# Patient Record
Sex: FEMALE | Race: BLACK OR AFRICAN AMERICAN | Employment: FULL TIME | URBAN - METROPOLITAN AREA
[De-identification: names, ages, dates, MRNs, and addresses within clinical notes are randomized per-mention and may not be internally consistent; named-entity substitution may affect disease eponyms.]

---

## 2017-02-27 ENCOUNTER — TELEPHONE (OUTPATIENT)
Dept: FAMILY MEDICINE CLINIC | Age: 27
End: 2017-02-27

## 2017-02-28 ENCOUNTER — OFFICE VISIT (OUTPATIENT)
Dept: FAMILY MEDICINE CLINIC | Age: 27
End: 2017-02-28

## 2017-02-28 VITALS
WEIGHT: 155.4 LBS | DIASTOLIC BLOOD PRESSURE: 86 MMHG | BODY MASS INDEX: 28.6 KG/M2 | TEMPERATURE: 96.4 F | OXYGEN SATURATION: 99 % | RESPIRATION RATE: 16 BRPM | HEIGHT: 62 IN | SYSTOLIC BLOOD PRESSURE: 118 MMHG | HEART RATE: 95 BPM

## 2017-02-28 DIAGNOSIS — K21.9 GASTROESOPHAGEAL REFLUX DISEASE, ESOPHAGITIS PRESENCE NOT SPECIFIED: ICD-10-CM

## 2017-02-28 DIAGNOSIS — R07.1 CHEST PAIN ON RESPIRATION: Primary | ICD-10-CM

## 2017-02-28 PROBLEM — A60.00 HERPES SIMPLEX INFECTION OF GENITOURINARY SYSTEM: Status: ACTIVE | Noted: 2017-02-28

## 2017-02-28 RX ORDER — PANTOPRAZOLE SODIUM 20 MG/1
20 TABLET, DELAYED RELEASE ORAL DAILY
Qty: 30 TAB | Refills: 1 | Status: SHIPPED | OUTPATIENT
Start: 2017-02-28 | End: 2017-05-04 | Stop reason: ALTCHOICE

## 2017-02-28 RX ORDER — VALACYCLOVIR HYDROCHLORIDE 1 G/1
1000 TABLET, FILM COATED ORAL
Refills: 5 | COMMUNITY
Start: 2017-02-23

## 2017-02-28 RX ORDER — FLUCONAZOLE 150 MG/1
TABLET ORAL
Refills: 0 | COMMUNITY
Start: 2017-02-23 | End: 2017-02-28 | Stop reason: ALTCHOICE

## 2017-02-28 RX ORDER — NAPROXEN 500 MG/1
500 TABLET ORAL
Qty: 60 TAB | Refills: 1 | Status: SHIPPED | OUTPATIENT
Start: 2017-02-28 | End: 2017-12-08 | Stop reason: ALTCHOICE

## 2017-02-28 NOTE — PROGRESS NOTES
HISTORY OF PRESENT ILLNESS  Gio Gibson is a 28 y.o. female. HPI    Pt of Dr. Camacho Sullivan, here for an \"acute visit. \"  Starting about 2 months ago, she started having some pain in her chest when she take a deep breath. Chest pain goes away as soon as she stops taking a deep breath. Pain is described as \"tight\". Pain extends up to her throat as well. Pain is more \"uncomfortable\" than painful. Pain is not present when she is swallowing. Has not tried anything for the pain. Denies any trauma or excessive/new exercise routine prior to the onset of symptoms. Denies associated symptoms of shortness of breath, palpitations, dizziness, numbness/tingling, peripheral edema. Has also been experiencing heartburn for the past month. Had heartburn twice when drinking alcohol, not with spicy foods or any other triggers. Took Tums, and symptoms resolved after that awhile. It does not occur every time she drinks, but has occurred twice. Drinks 2 - 3 shots most weekends. Went to [facility] on December 2016, had a CXR, which was normal. Patient doesn't think they did an EKG. Does not have any paperwork from that visit. No known history of early or sudden cardiac death in her family. No history of asthma or any other lung disease. Non-tobacco user; smokes marijuana 2 - 3 times per month. Past Medical History:   Diagnosis Date    Headache      History reviewed. No pertinent surgical history.   Family History   Problem Relation Age of Onset    No Known Problems Mother     No Known Problems Father      Social History     Social History    Marital status: SINGLE     Spouse name: N/A    Number of children: N/A    Years of education: N/A     Social History Main Topics    Smoking status: Never Smoker    Smokeless tobacco: Never Used    Alcohol use 1.2 oz/week     0 Standard drinks or equivalent, 2 Shots of liquor per week      Comment: social    Drug use: Yes     Special: Marijuana    Sexual activity: Yes     Partners: Male     Birth control/ protection: None      Comment: 3 male sexual partners in last 12 months     Other Topics Concern    None     Social History Narrative     Patient Active Problem List   Diagnosis Code    Environmental allergies Z91.09    Herpes simplex infection of genitourinary system A60.00     Outpatient Encounter Prescriptions as of 2/28/2017   Medication Sig Dispense Refill    valACYclovir (VALTREX) 1 gram tablet TAKE 1 TABLET BY MOUTH FOR 7 DAYS WITH OUTBREAKS  5    ASPIRIN/ACETAMINOPHEN/CAFFEINE (EXCEDRIN EXTRA STRENGTH PO) Take 2 Tabs by mouth as needed.  [DISCONTINUED] fluconazole (DIFLUCAN) 150 mg tablet TAKE 1 TABLET BY MOUTH ONCE  0    fluticasone (FLONASE) 50 mcg/actuation nasal spray 2 sprays by Both Nostrils route daily as needed for Rhinitis. 1 Bottle 6    promethazine-dextromethorphan (PROMETHAZINE-DM) 6.25-15 mg/5 mL syrup Take 5 mL by mouth every six (6) hours as needed for Cough. 180 mL 1    naproxen sodium (ALEVE) 220 mg tablet Take 2 Tabs by mouth two (2) times daily as needed. No facility-administered encounter medications on file as of 2/28/2017. Visit Vitals    /86 (BP 1 Location: Left arm, BP Patient Position: Sitting)    Pulse 95    Temp 96.4 °F (35.8 °C) (Oral)    Resp 16    Ht 5' 2\" (1.575 m)    Wt 155 lb 6.4 oz (70.5 kg)    LMP 02/23/2017    SpO2 99%    BMI 28.42 kg/m2         Review of Systems   Constitutional: Negative for chills, fever and malaise/fatigue. HENT: Negative for congestion and sore throat. Respiratory: Negative for cough, sputum production, shortness of breath and wheezing. Cardiovascular: Positive for chest pain. Negative for palpitations, orthopnea, leg swelling and PND. Gastrointestinal: Positive for heartburn. Negative for abdominal pain, blood in stool, constipation, diarrhea, nausea and vomiting. Musculoskeletal: Negative for myalgias.    Neurological: Negative for dizziness, tingling, tremors, sensory change, speech change, focal weakness, seizures and headaches. Physical Exam   Constitutional: She is oriented to person, place, and time. She appears well-developed and well-nourished. No distress. HENT:   Head: Normocephalic and atraumatic. Right Ear: External ear normal.   Left Ear: External ear normal.   Neck: Normal range of motion. Neck supple. No thyromegaly present. Cardiovascular: Normal rate, regular rhythm, normal heart sounds and intact distal pulses. Pulmonary/Chest: Effort normal and breath sounds normal. No respiratory distress. She has no wheezes. She has no rales. She exhibits tenderness. Neurological: She is alert and oriented to person, place, and time. Skin: Skin is warm and dry. She is not diaphoretic. Psychiatric: She has a normal mood and affect. Her behavior is normal. Judgment normal.   Nursing note and vitals reviewed. ASSESSMENT and PLAN    ICD-10-CM ICD-9-CM    1. Chest pain on respiration R07.1 786.52 AMB POC EKG ROUTINE W/ 12 LEADS, INTER & REP      D DIMER      SED RATE (ESR)      C REACTIVE PROTEIN, QT      CBC WITH AUTOMATED DIFF      METABOLIC PANEL, COMPREHENSIVE   2. Gastroesophageal reflux disease, esophagitis presence not specified K21.9 530.81      1. Chest pain on inspiration  Normal physical exam and EKG today (reviewed with Dr. Derrick Fowler). As pain/discomfort has persisted x 2 months, questionable etiology though seems pleuritic in nature. Will draw basic labs today and start protonix once daily and naproxen twice daily for 1 week, then PRN. Follow up with PCP in 1 month. Follow-up Disposition:  Return in about 1 month (around 3/28/2017) for med f/u.

## 2017-02-28 NOTE — MR AVS SNAPSHOT
Visit Information Date & Time Provider Department Dept. Phone Encounter #  
 2/28/2017  8:10 AM Larry Woodson NP [facility] [phone] [id number] Follow-up Instructions Return in about 1 month (around 3/28/2017) for med f/u. Upcoming Health Maintenance Date Due DTaP/Tdap/Td series (2 - Td) 6/12/2017 PAP AKA CERVICAL CYTOLOGY 8/12/2018 Allergies as of 2/28/2017  Review Complete On: 2/28/2017 By: Larry Woodson NP No Known Allergies Current Immunizations  Reviewed on 5/5/2014 Name Date Human Papillomavirus 12/19/2007, 8/3/2007, 6/1/2007 TB Skin Test (PPD) 5/27/2015 Tdap 6/12/2007 Not reviewed this visit You Were Diagnosed With   
  
 Codes Comments Chest pain on respiration    -  Primary ICD-10-CM: R07.1 ICD-9-CM: 786.52 Gastroesophageal reflux disease, esophagitis presence not specified     ICD-10-CM: K21.9 ICD-9-CM: 530.81 Vitals BP  
  
  
  
  
  
 118/86 (BP 1 Location: Left arm, BP Patient Position: Sitting) BMI and BSA Data Body Mass Index Body Surface Area  
 28.42 kg/m 2 1.76 m 2 Preferred Pharmacy Pharmacy Name Phone [facility]/PHARMACY #[id number]- [place] [phone] [place] [phone] Your Updated Medication List  
  
   
This list is accurate as of: 2/28/17  9:03 AM.  Always use your most recent med list.  
  
  
  
  
 EXCEDRIN EXTRA STRENGTH PO Take 2 Tabs by mouth as needed. naproxen 500 mg tablet Commonly known as:  NAPROSYN Take 1 Tab by mouth two (2) times daily as needed. pantoprazole 20 mg tablet Commonly known as:  PROTONIX Take 1 Tab by mouth daily. valACYclovir 1 gram tablet Commonly known as:  VALTREX  
TAKE 1 TABLET BY MOUTH FOR 7 DAYS WITH OUTBREAKS Prescriptions Sent to Pharmacy Refills  
 pantoprazole (PROTONIX) 20 mg tablet 1 Sig: Take 1 Tab by mouth daily. Class: Normal  
 Pharmacy: [address] [place] [address] Ph #: [phone] Route: Oral  
 naproxen (NAPROSYN) 500 mg tablet 1 Sig: Take 1 Tab by mouth two (2) times daily as needed. Class: Normal  
 Pharmacy: [address] [place] [address] [address] Ph #: [phone] Route: Oral  
  
We Performed the Following AMB POC EKG ROUTINE W/ 12 LEADS, INTER & REP [49402 CPT(R)] C REACTIVE PROTEIN, QT [60390 CPT(R)] CBC WITH AUTOMATED DIFF [26080 CPT(R)] D DIMER [id number] CPT(R)] METABOLIC PANEL, COMPREHENSIVE [20842 CPT(R)] SED RATE (ESR) [id number] CPT(R)] Follow-up Instructions Return in about 1 month (around 3/28/2017) for med f/u. Patient Instructions Gastroesophageal Reflux Disease (GERD): Care Instructions Your Care Instructions Gastroesophageal reflux disease (GERD) is the backward flow of stomach acid into the esophagus. The esophagus is the tube that leads from your throat to your stomach. A one-way valve prevents the stomach acid from moving up into this tube. When you have GERD, this valve does not close tightly enough. If you have mild GERD symptoms including heartburn, you may be able to control the problem with antacids or over-the-counter medicine. Changing your diet, losing weight, and making other lifestyle changes can also help reduce symptoms. Follow-up care is a key part of your treatment and safety. Be sure to make and go to all appointments, and call your doctor if you are having problems. Its also a good idea to know your test results and keep a list of the medicines you take. How can you care for yourself at home? · Take your medicines exactly as prescribed. Call your doctor if you think you are having a problem with your medicine. · Your doctor may recommend over-the-counter medicine.  For mild or occasional indigestion, antacids, such as Tums, Gaviscon, Mylanta, or Maalox, may help. Your doctor also may recommend over-the-counter acid reducers, such as Pepcid AC, Tagamet HB, Zantac 75, or Prilosec. Read and follow all instructions on the label. If you use these medicines often, talk with your doctor. · Change your eating habits. ¨ Its best to eat several small meals instead of two or three large meals. ¨ After you eat, wait 2 to 3 hours before you lie down. ¨ Chocolate, mint, and alcohol can make GERD worse. ¨ Spicy foods, foods that have a lot of acid (like tomatoes and oranges), and coffee can make GERD symptoms worse in some people. If your symptoms are worse after you eat a certain food, you may want to stop eating that food to see if your symptoms get better. · Do not smoke or chew tobacco. Smoking can make GERD worse. If you need help quitting, talk to your doctor about stop-smoking programs and medicines. These can increase your chances of quitting for good. · If you have GERD symptoms at night, raise the head of your bed 6 to 8 inches by putting the frame on blocks or placing a foam wedge under the head of your mattress. (Adding extra pillows does not work.) · Do not wear tight clothing around your middle. · Lose weight if you need to. Losing just 5 to 10 pounds can help. When should you call for help? Call your doctor now or seek immediate medical care if: 
· You have new or different belly pain. · Your stools are black and tarlike or have streaks of blood. Watch closely for changes in your health, and be sure to contact your doctor if: 
· Your symptoms have not improved after 2 days. · Food seems to catch in your throat or chest. 
Where can you learn more? Go to http://miranda-dang.info/. Enter Z075 in the search box to learn more about \"Gastroesophageal Reflux Disease (GERD): Care Instructions. \" Current as of: August 9, 2016 Content Version: 11.1 © 2983-8433 Healthwise, Incorporated. Care instructions adapted under license by [employer] (which disclaims liability or warranty for this information). If you have questions about a medical condition or this instruction, always ask your healthcare professional. [address] any warranty or liability for your use of this information. Introducing [facility] & HEALTH SERVICES! Sarai Poole introduces ChronoWake patient portal. Now you can access parts of your medical record, email your doctor's office, and request medication refills online. 1. In your internet browser, go to [email or web address] 2. Click on the First Time User? Click Here link in the Sign In box. You will see the New Member Sign Up page. 3. Enter your ChronoWake Access Code exactly as it appears below. You will not need to use this code after youve completed the sign-up process. If you do not sign up before the expiration date, you must request a new code. · ChronoWake Access Code: [email or web address] Expires: 5/29/2017  9:03 AM 
 
4. Enter the last four digits of your Social Security Number (xxxx) and Date of Birth (mm/dd/yyyy) as indicated and click Submit. You will be taken to the next sign-up page. 5. Create a ChronoWake ID. This will be your ChronoWake login ID and cannot be changed, so think of one that is secure and easy to remember. 6. Create a ChronoWake password. You can change your password at any time. 7. Enter your Password Reset Question and Answer. This can be used at a later time if you forget your password. 8. Enter your e-mail address. You will receive e-mail notification when new information is available in [address] 9. Click Sign Up. You can now view and download portions of your medical record. 10. Click the Download Summary menu link to download a portable copy of your medical information.  
 
If you have questions, please visit the Frequently Asked Questions section of the [facility]. Remember, [employer] is NOT to be used for urgent needs. For medical emergencies, dial 911. Now available from your iPhone and Android! Please provide this summary of care documentation to your next provider. Your primary care clinician is listed as Sandhya Ni. If you have any questions after today's visit, please call [phone].

## 2017-02-28 NOTE — PATIENT INSTRUCTIONS

## 2017-02-28 NOTE — PROGRESS NOTES
Chief Complaint   Patient presents with    Chest Pain     x 2 months     Patient states chest pain is upper chest area and notices it especially with swallowing.

## 2017-03-01 LAB
ALBUMIN SERPL-MCNC: 3.9 G/DL (ref 3.5–5.5)
ALBUMIN/GLOB SERPL: 1.3 {RATIO} (ref 1.1–2.5)
ALP SERPL-CCNC: 93 IU/L (ref 39–117)
ALT SERPL-CCNC: 10 IU/L (ref 0–32)
AST SERPL-CCNC: 12 IU/L (ref 0–40)
BASOPHILS # BLD AUTO: 0 X10E3/UL (ref 0–0.2)
BASOPHILS NFR BLD AUTO: 0 %
BILIRUB SERPL-MCNC: 0.3 MG/DL (ref 0–1.2)
BUN SERPL-MCNC: 8 MG/DL (ref 6–20)
BUN/CREAT SERPL: 12 (ref 8–20)
CALCIUM SERPL-MCNC: 8.8 MG/DL (ref 8.7–10.2)
CHLORIDE SERPL-SCNC: 100 MMOL/L (ref 96–106)
CO2 SERPL-SCNC: 24 MMOL/L (ref 18–29)
CREAT SERPL-MCNC: 0.66 MG/DL (ref 0.57–1)
CRP SERPL-MCNC: 23.8 MG/L (ref 0–4.9)
D DIMER PPP FEU-MCNC: 0.32 MG/L FEU (ref 0–0.49)
EOSINOPHIL # BLD AUTO: 0.4 X10E3/UL (ref 0–0.4)
EOSINOPHIL NFR BLD AUTO: 4 %
ERYTHROCYTE [DISTWIDTH] IN BLOOD BY AUTOMATED COUNT: 15.5 % (ref 12.3–15.4)
ERYTHROCYTE [SEDIMENTATION RATE] IN BLOOD BY WESTERGREN METHOD: 8 MM/HR (ref 0–32)
GLOBULIN SER CALC-MCNC: 3.1 G/DL (ref 1.5–4.5)
GLUCOSE SERPL-MCNC: 75 MG/DL (ref 65–99)
HCT VFR BLD AUTO: 32.4 % (ref 34–46.6)
HGB BLD-MCNC: 10.9 G/DL (ref 11.1–15.9)
IMM GRANULOCYTES # BLD: 0 X10E3/UL (ref 0–0.1)
IMM GRANULOCYTES NFR BLD: 0 %
LYMPHOCYTES # BLD AUTO: 1.9 X10E3/UL (ref 0.7–3.1)
LYMPHOCYTES NFR BLD AUTO: 18 %
MCH RBC QN AUTO: 21.9 PG (ref 26.6–33)
MCHC RBC AUTO-ENTMCNC: 33.6 G/DL (ref 31.5–35.7)
MCV RBC AUTO: 65 FL (ref 79–97)
MONOCYTES # BLD AUTO: 0.6 X10E3/UL (ref 0.1–0.9)
MONOCYTES NFR BLD AUTO: 5 %
NEUTROPHILS # BLD AUTO: 7.9 X10E3/UL (ref 1.4–7)
NEUTROPHILS NFR BLD AUTO: 73 %
PLATELET # BLD AUTO: 390 X10E3/UL (ref 150–379)
POTASSIUM SERPL-SCNC: 4.2 MMOL/L (ref 3.5–5.2)
PROT SERPL-MCNC: 7 G/DL (ref 6–8.5)
RBC # BLD AUTO: 4.98 X10E6/UL (ref 3.77–5.28)
SODIUM SERPL-SCNC: 141 MMOL/L (ref 134–144)
WBC # BLD AUTO: 10.8 X10E3/UL (ref 3.4–10.8)

## 2017-03-02 ENCOUNTER — TELEPHONE (OUTPATIENT)
Dept: FAMILY MEDICINE CLINIC | Age: 27
End: 2017-03-02

## 2017-04-11 ENCOUNTER — OFFICE VISIT (OUTPATIENT)
Dept: FAMILY MEDICINE CLINIC | Age: 27
End: 2017-04-11

## 2017-04-11 VITALS
BODY MASS INDEX: 28.46 KG/M2 | HEIGHT: 62 IN | TEMPERATURE: 97.4 F | DIASTOLIC BLOOD PRESSURE: 63 MMHG | OXYGEN SATURATION: 99 % | SYSTOLIC BLOOD PRESSURE: 121 MMHG | WEIGHT: 154.65 LBS | RESPIRATION RATE: 16 BRPM | HEART RATE: 92 BPM

## 2017-04-11 DIAGNOSIS — L08.9 SKIN INFECTION: ICD-10-CM

## 2017-04-11 DIAGNOSIS — H92.02 EAR PAIN, REFERRED, LEFT: Primary | ICD-10-CM

## 2017-04-11 RX ORDER — CLINDAMYCIN HYDROCHLORIDE 300 MG/1
300 CAPSULE ORAL EVERY 6 HOURS
Qty: 28 CAP | Refills: 0 | Status: SHIPPED | OUTPATIENT
Start: 2017-04-11 | End: 2017-04-18

## 2017-04-11 NOTE — MR AVS SNAPSHOT
Visit Information Date & Time Provider Department Dept. Phone Encounter #  
 4/11/2017  7:30 AM Israel Shields NP [facility] [phone] [id number] Follow-up Instructions Return if symptoms worsen or fail to improve. Upcoming Health Maintenance Date Due DTaP/Tdap/Td series (2 - Td) 6/12/2017 PAP AKA CERVICAL CYTOLOGY 8/12/2018 Allergies as of 4/11/2017  Review Complete On: 4/11/2017 By: Israel Shields NP No Known Allergies Current Immunizations  Reviewed on 5/5/2014 Name Date Human Papillomavirus 12/19/2007, 8/3/2007, 6/1/2007 TB Skin Test (PPD) 5/27/2015 Tdap 6/12/2007 Not reviewed this visit You Were Diagnosed With   
  
 Codes Comments Ear pain, referred, left    -  Primary ICD-10-CM: H92.02 
ICD-9-CM: 388.72 Skin infection     ICD-10-CM: L08.9 ICD-9-CM: 860. 9 Vitals BP Pulse Temp Resp Height(growth percentile) Weight(growth percentile) 121/63 (BP 1 Location: Left arm, BP Patient Position: Sitting) 92 97.4 °F (36.3 °C) (Oral) 16 5' 2\" (1.575 m) 154 lb 10.4 oz (70.1 kg) LMP SpO2 BMI OB Status Smoking Status 03/21/2017 (Approximate) 99% 28.29 kg/m2 Having regular periods Never Smoker BMI and BSA Data Body Mass Index Body Surface Area  
 28.29 kg/m 2 1.75 m 2 Preferred Pharmacy Pharmacy Name Phone [facility]/PHARMACY #[id number]- [place] [phone] [phone] Your Updated Medication List  
  
   
This list is accurate as of: 4/11/17  7:47 AM.  Always use your most recent med list.  
  
  
  
  
 clindamycin 300 mg capsule Commonly known as:  CLEOCIN Take 1 Cap by mouth every six (6) hours for 7 days. EXCEDRIN EXTRA STRENGTH PO Take 2 Tabs by mouth as needed. naproxen 500 mg tablet Commonly known as:  NAPROSYN Take 1 Tab by mouth two (2) times daily as needed. pantoprazole 20 mg tablet Commonly known as:  PROTONIX Take 1 Tab by mouth daily. valACYclovir 1 gram tablet Commonly known as:  VALTREX  
TAKE 1 TABLET BY MOUTH FOR 7 DAYS WITH OUTBREAKS Prescriptions Sent to Pharmacy Refills  
 clindamycin (CLEOCIN) 300 mg capsule 0 Sig: Take 1 Cap by mouth every six (6) hours for 7 days. Class: Normal  
 Pharmacy: [place], [address]  #: [phone] Route: Oral  
  
Follow-up Instructions Return if symptoms worsen or fail to improve. Patient Instructions Earache: Care Instructions Your Care Instructions Even though infection is a common cause of ear pain, not all ear pain means an infection. If you have ear pain and don't have an infection, it could be because of a jaw problem, such as temporomandibular joint (TMJ) pain. Or it could be because of a neck problem. When ear discomfort or pain is mild or comes and goes without other symptoms, home treatment may be all you need. Follow-up care is a key part of your treatment and safety. Be sure to make and go to all appointments, and call your doctor if you are having problems. It's also a good idea to know your test results and keep a list of the medicines you take. How can you care for yourself at home? · Apply heat on the ear to ease pain. To apply heat, put a warm water bottle, a heating pad set on low, or a warm cloth on your ear. Do not go to sleep with a heating pad on your skin. · Take an over-the-counter pain medicine, such as acetaminophen (Tylenol), ibuprofen (Advil, Motrin), or naproxen (Aleve). Be safe with medicines. Read and follow all instructions on the label. · Do not take two or more pain medicines at the same time unless the doctor told you to. Many pain medicines have acetaminophen, which is Tylenol. Too much acetaminophen (Tylenol) can be harmful. · Never insert anything, such as a cotton swab or a kenyon pin, into the ear. When should you call for help? Call your doctor now or seek immediate medical care if: 
· You have a fever. · You feel a lump in your neck or jaw. · The area around the ear starts to swell. · There is pus or blood draining from the ear. · There is new or different drainage from the ear. Watch closely for changes in your health, and be sure to contact your doctor if: 
· Your pain gets worse. · You do not get better as expected. Where can you learn more? Go to http://miranda-dang.info/. Enter J914 in the search box to learn more about \"Earache: Care Instructions. \" Current as of: July 29, 2016 Content Version: 11.2 © 2063-7347 Learn It Live. Care instructions adapted under license by [employer] (which disclaims liability or warranty for this information). If you have questions about a medical condition or this instruction, always ask your healthcare professional. [place] [address] any warranty or liability for your use of this information. Introducing [facility] & HEALTH SERVICES! [employer] introduces Neteven patient portal. Now you can access parts of your medical record, email your doctor's office, and request medication refills online. 1. In your internet browser, go to [email or web address] 2. Click on the First Time User? Click Here link in the Sign In box. You will see the New Member Sign Up page. 3. Enter your Neteven Access Code exactly as it appears below. You will not need to use this code after youve completed the sign-up process. If you do not sign up before the expiration date, you must request a new code. · Neteven Access Code: [email or web address] Expires: 5/29/2017 10:03 AM 
 
4. Enter the last four digits of your Social Security Number (xxxx) and Date of Birth (mm/dd/yyyy) as indicated and click Submit.  You will be taken to the next sign-up page. 5. Create a Swiftpage ID. This will be your Swiftpage login ID and cannot be changed, so think of one that is secure and easy to remember. 6. Create a Swiftpage password. You can change your password at any time. 7. Enter your Password Reset Question and Answer. This can be used at a later time if you forget your password. 8. Enter your e-mail address. You will receive e-mail notification when new information is available in [address] 9. Click Sign Up. You can now view and download portions of your medical record. 10. Click the Download Summary menu link to download a portable copy of your medical information. If you have questions, please visit the Frequently Asked Questions section of the Swiftpage website. Remember, Swiftpage is NOT to be used for urgent needs. For medical emergencies, dial 911. Now available from your iPhone and Android! Please provide this summary of care documentation to your next provider. Your primary care clinician is listed as Buddy Kaur. If you have any questions after today's visit, please call [phone].

## 2017-04-11 NOTE — PROGRESS NOTES
HISTORY OF PRESENT ILLNESS  Ely Lopez is a 28 y.o. female. HPI  Pt of Dr. Alf Segal, here for an acute visit. Has been having some pain on the left side of her face, just in front of her left ear. Pain started about 2 weeks ago, and felt a lump 2 days ago. No pain inside the ear. Pain is present all the time, but worse when she touches it. Pain is described as \"sore\" and \"uncomfortable. \" Pain radiates from temple to jaw. No fevers or chills. Denies exudate. Has never had anything like this before. Of note, went to the dentist about 2 weeks ago and had a filling done on her back lower molar on the left side. Additionally, chest pain has resolved since her visit last month. Takes protonix whenever she remembers, or when she has symptoms, and symptoms resolve right away. Past Medical History:   Diagnosis Date    Headache      History reviewed. No pertinent surgical history. Family History   Problem Relation Age of Onset    No Known Problems Mother     No Known Problems Father      Social History     Social History    Marital status: SINGLE     Spouse name: N/A    Number of children: N/A    Years of education: N/A     Social History Main Topics    Smoking status: Never Smoker    Smokeless tobacco: Never Used    Alcohol use 1.2 oz/week     0 Standard drinks or equivalent, 2 Shots of liquor per week      Comment: social    Drug use: Yes     Special: Marijuana    Sexual activity: Yes     Partners: Male     Birth control/ protection: None      Comment: 3 male sexual partners in last 12 months     Other Topics Concern    None     Social History Narrative     Patient Active Problem List   Diagnosis Code    Environmental allergies Z91.09    Herpes simplex infection of genitourinary system A60.00     Outpatient Encounter Prescriptions as of 4/11/2017   Medication Sig Dispense Refill    clindamycin (CLEOCIN) 300 mg capsule Take 1 Cap by mouth every six (6) hours for 7 days.  28 Cap 0    valACYclovir (VALTREX) 1 gram tablet TAKE 1 TABLET BY MOUTH FOR 7 DAYS WITH OUTBREAKS  5    pantoprazole (PROTONIX) 20 mg tablet Take 1 Tab by mouth daily. 30 Tab 1    naproxen (NAPROSYN) 500 mg tablet Take 1 Tab by mouth two (2) times daily as needed. 60 Tab 1    ASPIRIN/ACETAMINOPHEN/CAFFEINE (EXCEDRIN EXTRA STRENGTH PO) Take 2 Tabs by mouth as needed. No facility-administered encounter medications on file as of 4/11/2017. Visit Vitals    /63 (BP 1 Location: Left arm, BP Patient Position: Sitting)    Pulse 92    Temp 97.4 °F (36.3 °C) (Oral)    Resp 16    Ht 5' 2\" (1.575 m)    Wt 154 lb 10.4 oz (70.1 kg)    LMP 03/21/2017 (Approximate)    SpO2 99%    BMI 28.29 kg/m2         Review of Systems   Constitutional: Negative for chills and fever. HENT: Positive for ear pain. Negative for congestion, ear discharge and sore throat. Cardiovascular: Negative for chest pain and palpitations. Gastrointestinal: Negative for heartburn. Neurological: Negative for headaches. Physical Exam   Constitutional: She is oriented to person, place, and time. She appears well-developed and well-nourished. No distress. HENT:   Head: Normocephalic and atraumatic. Right Ear: Hearing, tympanic membrane, external ear and ear canal normal.   Left Ear: Tympanic membrane, external ear and ear canal normal.   Ears:    Mouth/Throat: Mucous membranes are normal. Normal dentition. No dental abscesses or dental caries. No posterior oropharyngeal edema, posterior oropharyngeal erythema or tonsillar abscesses. Cardiovascular: Normal rate, regular rhythm and normal heart sounds. Pulmonary/Chest: Effort normal and breath sounds normal. No respiratory distress. She has no wheezes. Lymphadenopathy:        Head (right side): No submental, no submandibular, no tonsillar, no preauricular, no posterior auricular and no occipital adenopathy present.         Head (left side): No submental, no submandibular, no tonsillar, no preauricular, no posterior auricular and no occipital adenopathy present. Neurological: She is alert and oriented to person, place, and time. Skin: Skin is warm and dry. She is not diaphoretic. Psychiatric: She has a normal mood and affect. Her behavior is normal. Judgment normal.   Nursing note and vitals reviewed. ASSESSMENT and PLAN    ICD-10-CM ICD-9-CM    1. Ear pain, referred, left H92.02 388.72 clindamycin (CLEOCIN) 300 mg capsule   2. Skin infection L08.9 686.9 clindamycin (CLEOCIN) 300 mg capsule     1. Left ear pain/skin infection  Timing with recent dental visit suspicious for dental source of pain. Will treat with clindamycin x 7 days, along with naproxen as needed for pain (Rx previously provided to patient). Patient to return if symptoms worsen or fail to improve. Follow-up Disposition:  Return if symptoms worsen or fail to improve.    Shelia Riddle NP

## 2017-04-11 NOTE — PROGRESS NOTES
Chief Complaint   Patient presents with    Ear Pain     left ear     Patient states pain in left ear started two weeks ago and progressively more uncomfortable. Patient notes a small knot in front of ear lobe .

## 2017-04-11 NOTE — PATIENT INSTRUCTIONS
Earache: Care Instructions  Your Care Instructions  Even though infection is a common cause of ear pain, not all ear pain means an infection. If you have ear pain and don't have an infection, it could be because of a jaw problem, such as temporomandibular joint (TMJ) pain. Or it could be because of a neck problem. When ear discomfort or pain is mild or comes and goes without other symptoms, home treatment may be all you need. Follow-up care is a key part of your treatment and safety. Be sure to make and go to all appointments, and call your doctor if you are having problems. It's also a good idea to know your test results and keep a list of the medicines you take. How can you care for yourself at home? · Apply heat on the ear to ease pain. To apply heat, put a warm water bottle, a heating pad set on low, or a warm cloth on your ear. Do not go to sleep with a heating pad on your skin. · Take an over-the-counter pain medicine, such as acetaminophen (Tylenol), ibuprofen (Advil, Motrin), or naproxen (Aleve). Be safe with medicines. Read and follow all instructions on the label. · Do not take two or more pain medicines at the same time unless the doctor told you to. Many pain medicines have acetaminophen, which is Tylenol. Too much acetaminophen (Tylenol) can be harmful. · Never insert anything, such as a cotton swab or a kenyon pin, into the ear. When should you call for help? Call your doctor now or seek immediate medical care if:  · You have a fever. · You feel a lump in your neck or jaw. · The area around the ear starts to swell. · There is pus or blood draining from the ear. · There is new or different drainage from the ear. Watch closely for changes in your health, and be sure to contact your doctor if:  · Your pain gets worse. · You do not get better as expected. Where can you learn more? Go to http://miranda-dang.info/.   Enter Y809 in the search box to learn more about \"Earache: Care Instructions. \"  Current as of: July 29, 2016  Content Version: 11.2  © 7392-6826 [employer], [employer]. Care instructions adapted under license by [employer] (which disclaims liability or warranty for this information). If you have questions about a medical condition or this instruction, always ask your healthcare professional. [place] [address] any warranty or liability for your use of this information.

## 2017-05-04 ENCOUNTER — OFFICE VISIT (OUTPATIENT)
Dept: INTERNAL MEDICINE CLINIC | Age: 27
End: 2017-05-04

## 2017-05-04 VITALS
DIASTOLIC BLOOD PRESSURE: 76 MMHG | OXYGEN SATURATION: 99 % | SYSTOLIC BLOOD PRESSURE: 120 MMHG | BODY MASS INDEX: 28.08 KG/M2 | HEART RATE: 95 BPM | TEMPERATURE: 98.6 F | HEIGHT: 62 IN | RESPIRATION RATE: 16 BRPM | WEIGHT: 152.6 LBS

## 2017-05-04 DIAGNOSIS — R59.0 LAD (LYMPHADENOPATHY), SUBMANDIBULAR: ICD-10-CM

## 2017-05-04 DIAGNOSIS — R59.0 LAD (LYMPHADENOPATHY), PREAURICULAR: Primary | ICD-10-CM

## 2017-05-04 NOTE — MR AVS SNAPSHOT
Visit Information Date & Time Provider Department Dept. Phone Encounter #  
 5/4/2017 10:40 AM Shlomo Ozuna MD [facility] [phone] [id number] Your Appointments 5/24/2017  3:00 PM  
ROUTINE CARE with Sapphire Olivera MD  
[facility] [address]) Appt Note: Pt lump in neck and in front of ear  
 [address] [place] [address] [id number] [phone] [address] [place] [address] [address] Upcoming Health Maintenance Date Due DTaP/Tdap/Td series (2 - Td) 6/12/2017 INFLUENZA AGE 9 TO ADULT 8/1/2017 PAP AKA CERVICAL CYTOLOGY 8/12/2018 Allergies as of 5/4/2017  Review Complete On: 5/4/2017 By: Shlomo Ozuna MD  
 No Known Allergies Current Immunizations  Reviewed on 5/5/2014 Name Date Human Papillomavirus 12/19/2007, 8/3/2007, 6/1/2007 TB Skin Test (PPD) 5/27/2015 Tdap 6/12/2007 Not reviewed this visit You Were Diagnosed With   
  
 Codes Comments LAD (lymphadenopathy), preauricular    -  Primary ICD-10-CM: R59.0 ICD-9-CM: 785.6 LAD (lymphadenopathy), submandibular     ICD-10-CM: R59.0 ICD-9-CM: 979. 6 Vitals BP Pulse Temp Resp Height(growth percentile) Weight(growth percentile) 120/76 (BP 1 Location: Left arm, BP Patient Position: Sitting) 95 98.6 °F (37 °C) (Oral) 16 5' 2.25\" (1.581 m) 152 lb 9.6 oz (69.2 kg) LMP SpO2 BMI OB Status Smoking Status 04/21/2017 99% 27.69 kg/m2 Having regular periods Never Smoker Vitals History BMI and BSA Data Body Mass Index Body Surface Area  
 27.69 kg/m 2 1.74 m 2 Preferred Pharmacy Pharmacy Name Phone [id number] [place], [place] - [address] [place] [address] [phone] Your Updated Medication List  
  
   
This list is accurate as of: 5/4/17 11:26 AM.  Always use your most recent med list.  
  
  
  
  
 EXCEDRIN EXTRA STRENGTH PO Take 2 Tabs by mouth as needed. naproxen 500 mg tablet Commonly known as:  NAPROSYN Take 1 Tab by mouth two (2) times daily as needed. valACYclovir 1 gram tablet Commonly known as:  VALTREX  
TAKE 1 TABLET BY MOUTH FOR 7 DAYS WITH OUTBREAKS We Performed the Following CBC WITH AUTOMATED DIFF [62887 CPT(R)] To-Do List   
 2017 Imaging:  CT NECK SOFT TISSUE W CONT Patient Instructions MyChart Activation Thank you for requesting access to PeptiVir. Please follow the instructions below to securely access and download your online medical record. PeptiVir allows you to send messages to your doctor, view your test results, renew your prescriptions, schedule appointments, and more. How Do I Sign Up? 1. In your internet browser, go to [email or web address] 
2. Click on the First Time User? Click Here link in the Sign In box. You will be redirect to the New Member Sign Up page. 3. Enter your PeptiVir Access Code exactly as it appears below. You will not need to use this code after youve completed the sign-up process. If you do not sign up before the expiration date, you must request a new code. PeptiVir Access Code: [email or web address] Expires: 2017 10:03 AM (This is the date your PeptiVir access code will ) 4. Enter the last four digits of your Social Security Number (xxxx) and Date of Birth (mm/dd/yyyy) as indicated and click Submit. You will be taken to the next sign-up page. 5. Create a PeptiVir ID. This will be your PeptiVir login ID and cannot be changed, so think of one that is secure and easy to remember. 6. Create a PeptiVir password. You can change your password at any time. 7. Enter your Password Reset Question and Answer. This can be used at a later time if you forget your password. 8. Enter your e-mail address. You will receive e-mail notification when new information is available in [address] 9. Click Sign Up. You can now view and download portions of your medical record. 10. Click the Download Summary menu link to download a portable copy of your medical information. Additional Information If you have questions, please visit the Frequently Asked Questions section of the Freeze Tag website at [email or web address]/[employer] Veter[employer] [email or web address]/. Remember, ISIGN Mediat is NOT to be used for urgent needs. For medical emergencies, dial 911. Treat your allergies:  
Zyrtec (cetirizine) 10mg at night Flonase nasal spray 2 squirts each nostril daily. Introducing [facility]! [facility] introduces Freeze Tag patient portal. Now you can access parts of your medical record, email your doctor's office, and request medication refills online. 1. In your internet browser, go to [email or web address]/National Veterinary Associatest 2. Click on the First Time User? Click Here link in the Sign In box. You will see the New Member Sign Up page. 3. Enter your Freeze Tag Access Code exactly as it appears below. You will not need to use this code after youve completed the sign-up process. If you do not sign up before the expiration date, you must request a new code. · Freeze Tag Access Code: [email or web address] Expires: 5/29/2017 10:03 AM 
 
4. Enter the last four digits of your Social Security Number (xxxx) and Date of Birth (mm/dd/yyyy) as indicated and click Submit. You will be taken to the next sign-up page. 5. Create a Freeze Tag ID. This will be your Freeze Tag login ID and cannot be changed, so think of one that is secure and easy to remember. 6. Create a Freeze Tag password. You can change your password at any time. 7. Enter your Password Reset Question and Answer. This can be used at a later time if you forget your password. 8. Enter your e-mail address. You will receive e-mail notification when new information is available in [address] 9. Click Sign Up. You can now view and download portions of your medical record. 10. Click the Download Summary menu link to download a portable copy of your medical information. If you have questions, please visit the Frequently Asked Questions section of the [employer] website. Remember, iTraff Technology is NOT to be used for urgent needs. For medical emergencies, dial 911. Now available from your iPhone and Android! Please provide this summary of care documentation to your next provider. Your primary care clinician is listed as ITA GLASS. If you have any questions after today's visit, please call [phone].

## 2017-05-04 NOTE — PATIENT INSTRUCTIONS
Yieldr Activation    Thank you for requesting access to Yieldr. Please follow the instructions below to securely access and download your online medical record. Yieldr allows you to send messages to your doctor, view your test results, renew your prescriptions, schedule appointments, and more. How Do I Sign Up? 1. In your internet browser, go to [email or web address]  2. Click on the First Time User? Click Here link in the Sign In box. You will be redirect to the New Member Sign Up page. 3. Enter your Yieldr Access Code exactly as it appears below. You will not need to use this code after youve completed the sign-up process. If you do not sign up before the expiration date, you must request a new code. Yieldr Access Code: [email or web address]  Expires: 2017 10:03 AM (This is the date your Yieldr access code will )    4. Enter the last four digits of your Social Security Number (xxxx) and Date of Birth (mm/dd/yyyy) as indicated and click Submit. You will be taken to the next sign-up page. 5. Create a Yieldr ID. This will be your Yieldr login ID and cannot be changed, so think of one that is secure and easy to remember. 6. Create a Yieldr password. You can change your password at any time. 7. Enter your Password Reset Question and Answer. This can be used at a later time if you forget your password. 8. Enter your e-mail address. You will receive e-mail notification when new information is available in [address] 9. Click Sign Up. You can now view and download portions of your medical record. 10. Click the Download Summary menu link to download a portable copy of your medical information. Additional Information    If you have questions, please visit the Frequently Asked Questions section of the Yieldr website at [email or web address]. [employer]. [employer]/SpePharmhart/. Remember, Yieldr is NOT to be used for urgent needs. For medical emergencies, dial 911.       Treat your allergies:   Zyrtec (cetirizine) 10mg at night  Flonase nasal spray 2 squirts each nostril daily.

## 2017-05-04 NOTE — PROGRESS NOTES
HISTORY OF PRESENT ILLNESS  Keri Matthew is a 28 y.o. female. HPI  Lump in front of left ear and then over left jaw. Seen previously by NP at PCP, last 4/11, placed on clindamycin helped with ear pain but lump has not improved. Lump first appeared approx 2 months ago, still slightly tender but has not increased in size. Mass over left jaw just began this weekend, mobile. No other masses on neck or jaw. Sinuses, teeth and hearing is normal.   Returned to dentist as prior to symtpoms appearing had cavity filled on left lower teeth - repeated xray and nml. No fevers or chills. Feels a little tight on left while eating/chewing. Past Medical History:   Diagnosis Date    Headache      History reviewed. No pertinent surgical history. No Known Allergies    Current Outpatient Prescriptions:     valACYclovir (VALTREX) 1 gram tablet, TAKE 1 TABLET BY MOUTH FOR 7 DAYS WITH OUTBREAKS, Disp: , Rfl: 5    naproxen (NAPROSYN) 500 mg tablet, Take 1 Tab by mouth two (2) times daily as needed. , Disp: 60 Tab, Rfl: 1    ASPIRIN/ACETAMINOPHEN/CAFFEINE (EXCEDRIN EXTRA STRENGTH PO), Take 2 Tabs by mouth as needed. , Disp: , Rfl:   Social History     Social History    Marital status: SINGLE     Spouse name: N/A    Number of children: N/A    Years of education: N/A     Occupational History    Not on file.      Social History Main Topics    Smoking status: Never Smoker    Smokeless tobacco: Never Used    Alcohol use 1.2 oz/week     2 Shots of liquor, 0 Standard drinks or equivalent per week      Comment: social    Drug use: Yes     Special: Marijuana    Sexual activity: Yes     Partners: Male     Birth control/ protection: None      Comment: 3 male sexual partners in last 12 months     Other Topics Concern    Not on file     Social History Narrative     Family History   Problem Relation Age of Onset    No Known Problems Mother     No Known Problems Father      Visit Vitals    /76 (BP 1 Location: Left arm, BP Patient Position: Sitting)    Pulse 95    Temp 98.6 °F (37 °C) (Oral)    Resp 16    Ht 5' 2.25\" (1.581 m)    Wt 152 lb 9.6 oz (69.2 kg)    SpO2 99%    BMI 27.69 kg/m2       ROS  See above  Physical Exam   Constitutional: She appears well-developed and well-nourished. HENT:   Head: Normocephalic and atraumatic. Right Ear: External ear normal.   Left Ear: External ear normal.   Nares - mild edema of turbinates with clear discharge  OP - post nasal drainage  Tender enlarged mobile soft left preauricular ln   Neck: No thyromegaly present. Cardiovascular: Normal rate, regular rhythm and normal heart sounds. Exam reveals no gallop and no friction rub. No murmur heard. Pulmonary/Chest: Effort normal and breath sounds normal.   Musculoskeletal: She exhibits no edema. Lymphadenopathy:     She has cervical adenopathy (enlarged tender mobile left submandibular LN near angle of jaw). Vitals reviewed. ASSESSMENT and PLAN  LAD with 2 enlarged LN, 1 left preauricular and 2nd submandibular. Did not improve with abx. Some allergy symptoms but no symptoms in infection.   Check CT of neck and cbc to further evaluate  Orders Placed This Encounter    CT NECK SOFT TISSUE W CONT    CBC WITH AUTOMATED DIFF     Follow-up Disposition: Not on File

## 2017-05-05 LAB
BASOPHILS # BLD AUTO: 0 X10E3/UL (ref 0–0.2)
BASOPHILS NFR BLD AUTO: 0 %
EOSINOPHIL # BLD AUTO: 0.1 X10E3/UL (ref 0–0.4)
EOSINOPHIL NFR BLD AUTO: 1 %
ERYTHROCYTE [DISTWIDTH] IN BLOOD BY AUTOMATED COUNT: 18.4 % (ref 12.3–15.4)
HCT VFR BLD AUTO: 35.2 % (ref 34–46.6)
HGB BLD-MCNC: 11.8 G/DL (ref 11.1–15.9)
IMM GRANULOCYTES # BLD: 0 X10E3/UL (ref 0–0.1)
IMM GRANULOCYTES NFR BLD: 0 %
LYMPHOCYTES # BLD AUTO: 1.7 X10E3/UL (ref 0.7–3.1)
LYMPHOCYTES NFR BLD AUTO: 17 %
MCH RBC QN AUTO: 21.1 PG (ref 26.6–33)
MCHC RBC AUTO-ENTMCNC: 33.5 G/DL (ref 31.5–35.7)
MCV RBC AUTO: 63 FL (ref 79–97)
MONOCYTES # BLD AUTO: 0.6 X10E3/UL (ref 0.1–0.9)
MONOCYTES NFR BLD AUTO: 6 %
NEUTROPHILS # BLD AUTO: 7.6 X10E3/UL (ref 1.4–7)
NEUTROPHILS NFR BLD AUTO: 76 %
PLATELET # BLD AUTO: 422 X10E3/UL (ref 150–379)
RBC # BLD AUTO: 5.6 X10E6/UL (ref 3.77–5.28)
WBC # BLD AUTO: 10.1 X10E3/UL (ref 3.4–10.8)

## 2017-05-12 ENCOUNTER — TELEPHONE (OUTPATIENT)
Dept: ONCOLOGY | Age: 27
End: 2017-05-12

## 2017-05-12 ENCOUNTER — HOSPITAL ENCOUNTER (OUTPATIENT)
Dept: CT IMAGING | Age: 27
Discharge: HOME | End: 2017-05-12
Attending: INTERNAL MEDICINE
Payer: COMMERCIAL

## 2017-05-12 DIAGNOSIS — R59.0 LAD (LYMPHADENOPATHY), PREAURICULAR: ICD-10-CM

## 2017-05-12 DIAGNOSIS — R59.0 LAD (LYMPHADENOPATHY), SUBMANDIBULAR: ICD-10-CM

## 2017-05-12 PROCEDURE — 70491 CT SOFT TISSUE NECK W/DYE: CPT

## 2017-05-12 PROCEDURE — 74011636320 HC RX REV CODE- 636/320: Performed by: RADIOLOGY

## 2017-05-12 RX ADMIN — IOPAMIDOL 100 ML: 612 INJECTION, SOLUTION INTRAVENOUS at 09:08

## 2017-05-12 NOTE — TELEPHONE ENCOUNTER
Dr. Sprague Come called stating she would like to speak with Dr. Leonardo Aparicio in regards to patient. She has not been seen at this office before but patient had a ct of her neck done. Dr. Corina Sargent would like to discuss the CT.  She can be reached on her cell phone at [phone]

## 2017-05-15 ENCOUNTER — TELEPHONE (OUTPATIENT)
Dept: INTERNAL MEDICINE CLINIC | Age: 27
End: 2017-05-15

## 2017-05-15 NOTE — TELEPHONE ENCOUNTER
Discussed with pt. Discussed possibility for carcinoma of thymus. Referred to [facility] at [facility].

## 2017-05-18 ENCOUNTER — PATIENT MESSAGE (OUTPATIENT)
Dept: INTERNAL MEDICINE CLINIC | Age: 27
End: 2017-05-18

## 2017-05-18 NOTE — TELEPHONE ENCOUNTER
----- Message from Tatyana Grimes MD sent at 5/18/2017  2:38 PM EDT -----  Regarding: FW: Test Results Question  Can you call Jessica Shelley office at [facility]. CHeck on appointment. 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 We sent records Tuesday. She has not heard back from them. I want her in ASAP. thanks  ----- Message -----     From: Cas Aidanfidel Blanco     Sent: 5/18/2017   8:44 AM       To: Tatyana Grimes MD  Subject: FW: Test Results Question                            ----- Message -----     From: Denisa Giang     Sent: 5/18/2017   8:35 AM       To: Evelyn Caba Pool  Subject: RE: Test Results Question                        How long should I wait for them to call me back?! I'm becoming very anxious about this. I know you said if they can't see me by this week or next that you would call.  ----- Message -----  From: Tatyana Grimes MD  Sent: 5/16/2017  4:03 PM EDT  To: Denisa Giang  Subject: RE: Test Results Question    We will fax over our information to his office. Dr. Lauro Castro    ----- Message -----     From: Denisa Giang     Sent: 5/16/2017  3:12 PM EDT       To: Tatyana Grimes MD  Subject: Test Results Question    Hi Dr. Lauro Castro. .. I have called the office of Dr. Vijay Millan as you referred me to do so. I completed a New Patient intake and waiting for the intake  to call me back and make an appointment.  Just wanted to let you know what's going on

## 2017-12-01 ENCOUNTER — TELEPHONE (OUTPATIENT)
Dept: INTERNAL MEDICINE CLINIC | Age: 27
End: 2017-12-01

## 2017-12-01 NOTE — TELEPHONE ENCOUNTER
Pt is requesting to come in on either the 6th, 7th, or 8th of next week. The pt called to make and appt because she had broken out in a rash all over her body and the earliest she could get in to see Dr. Ermelinda Ortega is 12-12-17, the pt would like a call because she would like to come in the week of the 4th.  The pt best contact number is [phone].              From answering service

## 2017-12-04 NOTE — TELEPHONE ENCOUNTER
Left message asking for a return call to the office. Moved the patient's appt up to 12/08/17 at 12 pm.  Need to make sure that it is fine with the patient.

## 2017-12-08 ENCOUNTER — OFFICE VISIT (OUTPATIENT)
Dept: INTERNAL MEDICINE CLINIC | Age: 27
End: 2017-12-08

## 2017-12-08 VITALS
BODY MASS INDEX: 28.34 KG/M2 | SYSTOLIC BLOOD PRESSURE: 106 MMHG | HEIGHT: 62 IN | HEART RATE: 82 BPM | OXYGEN SATURATION: 99 % | WEIGHT: 154 LBS | RESPIRATION RATE: 17 BRPM | DIASTOLIC BLOOD PRESSURE: 67 MMHG | TEMPERATURE: 98.3 F

## 2017-12-08 DIAGNOSIS — L20.82 FLEXURAL ECZEMA: Primary | ICD-10-CM

## 2017-12-08 RX ORDER — MOMETASONE FUROATE 1 MG/G
CREAM TOPICAL DAILY
Qty: 45 G | Refills: 0 | Status: SHIPPED | OUTPATIENT
Start: 2017-12-08 | End: 2019-02-11 | Stop reason: ALTCHOICE

## 2017-12-08 NOTE — MR AVS SNAPSHOT
Visit Information Date & Time Provider Department Dept. Phone Encounter #  
 12/8/2017 12:00 PM Lucie Valera MD [facility] [phone] [id number] Upcoming Health Maintenance Date Due DTaP/Tdap/Td series (2 - Td) 6/12/2017 Influenza Age 5 to Adult 8/1/2017 PAP AKA CERVICAL CYTOLOGY 8/12/2018 Allergies as of 12/8/2017  Review Complete On: 12/8/2017 By: Lucie Valera MD  
 No Known Allergies Current Immunizations  Reviewed on 5/5/2014 Name Date Human Papillomavirus 12/19/2007, 8/3/2007, 6/1/2007 TB Skin Test (PPD) 5/27/2015 Tdap 6/12/2007 Not reviewed this visit You Were Diagnosed With   
  
 Codes Comments Flexural eczema    -  Primary ICD-10-CM: V50.13 ICD-9-CM: 691.8 Vitals BP Pulse Temp Resp Height(growth percentile) Weight(growth percentile) 106/67 (BP 1 Location: Left arm, BP Patient Position: Sitting) 82 98.3 °F (36.8 °C) (Oral) 17 5' 2.25\" (1.581 m) 154 lb (69.9 kg) LMP SpO2 BMI OB Status Smoking Status 11/26/2017 99% 27.94 kg/m2 Having regular periods Never Smoker Vitals History BMI and BSA Data Body Mass Index Body Surface Area  
 27.94 kg/m 2 1.75 m 2 Preferred Pharmacy Pharmacy Name Phone [id number] Yates, [address] [place] [phone] Your Updated Medication List  
  
   
This list is accurate as of: 12/8/17 12:22 PM.  Always use your most recent med list.  
  
  
  
  
 EXCEDRIN EXTRA STRENGTH PO Take 2 Tabs by mouth as needed. mometasone 0.1 % topical cream  
Commonly known as:  James Hof Apply  to affected area daily. valACYclovir 1 gram tablet Commonly known as:  VALTREX  
TAKE 1 TABLET BY MOUTH FOR 7 DAYS WITH OUTBREAKS Prescriptions Sent to Pharmacy Refills  
 mometasone (ELOCON) 0.1 % topical cream 0 Sig: Apply  to affected area daily.   
 Class: Normal  
 Pharmacy: [id number] #: [phone] Route: Topical  
  
Introducing [facility]! Dear Mimi Chavez: Thank you for requesting a [employer] account. Our records indicate that you already have an active [employer] account. You can access your account anytime at [email or web address] Did you know that you can access your hospital and ER discharge instructions at any time in [employer]? You can also review all of your test results from your hospital stay or ER visit. Additional Information If you have questions, please visit the Frequently Asked Questions section of the [employer] website at [email or web address] Remember, [employer] is NOT to be used for urgent needs. For medical emergencies, dial 911. Now available from your iPhone and Android! Please provide this summary of care documentation to your next provider. Your primary care clinician is listed as ITA GLASS. If you have any questions after today's visit, please call [phone].

## 2017-12-08 NOTE — PROGRESS NOTES
HISTORY OF PRESENT ILLNESS  Tiffani German is a 28 y.o. female. HPI  Pruritic rash developed just prior to Thanksgiving. Scattered areas on body, bilat antecubital spaces, palmar wrist, groin, abdomen and behind her knees. Red raised bumps. Worse at night. Saw her nurse at oncology, felt was dermatitis and recommended seeing PCP. Using benedryl itch cream which helps temporarily. Hot shower can help sooth area as well. No new beauty products or household items. Hx of eczema and similar in appearance. Had radiology f/u - had PET scan Tuesday - no evidence of tumors. Stopped chemo in end of September and stopped radiation end of October for Hodgkin's Lymphoma. Doing great! Current Outpatient Prescriptions:     valACYclovir (VALTREX) 1 gram tablet, TAKE 1 TABLET BY MOUTH FOR 7 DAYS WITH OUTBREAKS, Disp: , Rfl: 5    ASPIRIN/ACETAMINOPHEN/CAFFEINE (EXCEDRIN EXTRA STRENGTH PO), Take 2 Tabs by mouth as needed. , Disp: , Rfl:     Visit Vitals    /67 (BP 1 Location: Left arm, BP Patient Position: Sitting)    Pulse 82    Temp 98.3 °F (36.8 °C) (Oral)    Resp 17    Ht 5' 2.25\" (1.581 m)    Wt 154 lb (69.9 kg)    SpO2 99%    BMI 27.94 kg/m2     ROS  See above  Physical Exam   Constitutional: She appears well-developed and well-nourished. HENT:   Head: Normocephalic and atraumatic. Skin:   Erythematous papular rash with evidence of excoriations right antecubital space, left flexor wrist and bilat groin. Vitals reviewed. ASSESSMENT and PLAN  Dermatitis/eczema - mometasone cream to affected areas daily. Zyrtec 10mg at night. If no improvement, will refer to derm.     Orders Placed This Encounter    mometasone (ELOCON) 0.1 % topical cream     Follow-up Disposition: Not on File

## 2019-01-22 ENCOUNTER — OFFICE VISIT (OUTPATIENT)
Dept: INTERNAL MEDICINE CLINIC | Age: 29
End: 2019-01-22

## 2019-01-22 VITALS
RESPIRATION RATE: 20 BRPM | OXYGEN SATURATION: 97 % | HEART RATE: 87 BPM | DIASTOLIC BLOOD PRESSURE: 76 MMHG | TEMPERATURE: 98 F | WEIGHT: 153 LBS | SYSTOLIC BLOOD PRESSURE: 104 MMHG | BODY MASS INDEX: 28.16 KG/M2 | HEIGHT: 62 IN

## 2019-01-22 DIAGNOSIS — J06.9 ACUTE URI: Primary | ICD-10-CM

## 2019-01-22 RX ORDER — FLUTICASONE PROPIONATE 50 MCG
2 SPRAY, SUSPENSION (ML) NASAL DAILY
Qty: 1 BOTTLE | Refills: 1 | Status: SHIPPED | OUTPATIENT
Start: 2019-01-22

## 2019-01-22 RX ORDER — CODEINE PHOSPHATE AND GUAIFENESIN 10; 100 MG/5ML; MG/5ML
5 SOLUTION ORAL
Qty: 25 ML | Refills: 0 | Status: SHIPPED | OUTPATIENT
Start: 2019-01-22 | End: 2019-01-22

## 2019-01-22 RX ORDER — AZELASTINE 1 MG/ML
1 SPRAY, METERED NASAL 2 TIMES DAILY
Qty: 1 BOTTLE | Refills: 1 | Status: SHIPPED | OUTPATIENT
Start: 2019-01-22 | End: 2020-03-10

## 2019-01-22 RX ORDER — CODEINE PHOSPHATE AND GUAIFENESIN 10; 100 MG/5ML; MG/5ML
5 SOLUTION ORAL
Qty: 25 ML | Refills: 0 | Status: SHIPPED | OUTPATIENT
Start: 2019-01-22 | End: 2019-02-11 | Stop reason: ALTCHOICE

## 2019-01-22 NOTE — PROGRESS NOTES
Subjective:   Chasidy Ramirez is a 33 y.o. female who complains of congestion, sneezing, sore throat and cough described as productive at times for 3 days, unchanged since that time. She denies a history of chills, fevers, shortness of breath and wheezing. Evaluation to date: none. Treatment to date: dayquil and nyquil. Patient does not smoke cigarettes. Relevant PMH: No pertinent additional PMH. Patient Active Problem List    Diagnosis Date Noted    Hodgkin's lymphoma ([facility] [address]     Herpes simplex infection of genitourinary system 02/28/2017    Environmental allergies 08/12/2015     No Known Allergies     Review of Systems  Pertinent items are noted in HPI. Objective:     Visit Vitals  /76   Pulse 87   Temp 98 °F (36.7 °C) (Oral)   Resp 20   Ht 5' 2.25\" (1.581 m)   Wt 153 lb (69.4 kg)   SpO2 97%   BMI 27.76 kg/m²     General:  alert, cooperative, no distress   Eyes: negative   Ears: abnormal TM AS - cerumen is present small window at 12 \"clock   Sinuses: Normal paranasal sinuses without tenderness   Mouth:  abnormal findings: mild oropharyngeal erythema   Neck: no adenopathy. Heart: normal rate and regular rhythm, no murmurs noted. Lungs: clear to auscultation bilaterally, no current wheeze   Abdomen: Not examined        Assessment/Plan:   viral upper respiratory illness  RTC prn. Diagnoses and all orders for this visit:    1. Acute URI  -     azelastine (ASTELIN) 137 mcg (0.1 %) nasal spray; 1 [place] by Both Nostrils route two (2) times a day. Use in each nostril as directed  -     fluticasone (FLONASE) 50 mcg/actuation nasal spray; 2 Sprays by Both Nostrils route daily.  -     guaiFENesin-codeine (CHERATUSSIN AC) 100-10 mg/5 mL solution; Take 5 mL by mouth nightly as needed for Cough. Max Daily Amount: 5 mL. I advised the patient that her symptoms are probably viral. I would like for her to take mucinex during the day and take the cheratussin at night.  If symptoms change or worsen then she is to contact the office to see if antibiotic is appropriate at that time. All this was discussed with the patient and she understands and agrees.

## 2019-01-22 NOTE — LETTER
NOTIFICATION RETURN TO WORK / SCHOOL 
 
1/22/2019 10:13 AM 
 
Ms. Chasidy Ramirez [employer] [address] [id number] To Whom It May Concern: 
 
Chasidy Ramirez is currently under the care of Lian Mcbride.. She will return to work/school on: 1/23/2019 If there are questions or concerns please have the patient contact our office.  
 
 
 
Sincerely, 
 
 
Javed Albright PA-C

## 2019-02-11 ENCOUNTER — OFFICE VISIT (OUTPATIENT)
Dept: INTERNAL MEDICINE CLINIC | Age: 29
End: 2019-02-11

## 2019-02-11 VITALS
WEIGHT: 155 LBS | HEIGHT: 62 IN | TEMPERATURE: 98.7 F | OXYGEN SATURATION: 99 % | BODY MASS INDEX: 28.52 KG/M2 | DIASTOLIC BLOOD PRESSURE: 70 MMHG | HEART RATE: 74 BPM | SYSTOLIC BLOOD PRESSURE: 108 MMHG

## 2019-02-11 DIAGNOSIS — C81.91 HODGKIN LYMPHOMA OF LYMPH NODES OF NECK, UNSPECIFIED HODGKIN LYMPHOMA TYPE (HCC): ICD-10-CM

## 2019-02-11 DIAGNOSIS — N64.4 BREAST TENDERNESS: Primary | ICD-10-CM

## 2019-02-11 DIAGNOSIS — N64.52 BREAST DISCHARGE: ICD-10-CM

## 2019-02-11 NOTE — PROGRESS NOTES
HISTORY OF PRESENT ILLNESS Genevieve Reilly is a 33 y.o. female. Wednesday through Saturday was having painful nipples bilat left > right. Menstrual cycle ended Wednesday. Flaking of skin with some discharge on left - clear - couldn't see by could feel. Started to improve yesterday, today still mildly tender but much better. Rash on left neck on and off x 1 month. Started back this week. Using Seravue and soap for eczema. Very pruitis. Has derm appointment on Wednesday. Remains clear from Hodgkins Lymphoma. Had scans in fall 2018 which were clear and has f/j with her oncologist @ [facility] in March. > 1 year since completing treatment. Current Outpatient Medications:  
  azelastine (ASTELIN) 137 mcg (0.1 %) nasal spray, 1 [place] by Both Nostrils route two (2) times a day. Use in each nostril as directed, Disp: 1 Bottle, Rfl: 1 
  fluticasone (FLONASE) 50 mcg/actuation nasal spray, 2 Sprays by Both Nostrils route daily. , Disp: 1 Bottle, Rfl: 1   valACYclovir (VALTREX) 1 gram tablet, TAKE 1 TABLET BY MOUTH FOR 7 DAYS WITH OUTBREAKS, Disp: , Rfl: 5   ASPIRIN/ACETAMINOPHEN/CAFFEINE (EXCEDRIN EXTRA STRENGTH PO), Take 2 Tabs by mouth as needed. , Disp: , Rfl:  
 
/70   Pulse 74   Temp 98.7 °F (37.1 °C) (Oral)   Ht 5' 2.25\" (1.581 m)   Wt 155 lb (70.3 kg)   SpO2 99%   BMI 28.12 kg/m² ROS Physical Exam  
Constitutional: She appears well-developed and well-nourished. HENT:  
Head: Normocephalic and atraumatic. Pulmonary/Chest:  
bilat breasts nontender, symmetric, no masses. No nipple discharge or skin changes. No axillary lad Skin: Rash (hyperpigmented rash left neck.  ) noted. Vitals reviewed. ASSESSMENT and PLAN 
bilat breast tenderness with ? Left nipple discharge - most likely secondary to hormone change secondary to timing and that it is resolving. With discharge will check prolactin level. If pain recurs will image at that time. Eczema left neck - otc TAC cream.  Has f/u with derm in 2 days. Hx Hodgkin's Lymphoma - no sign of recurrence. Continues with oncologist for surveillance. Orders Placed This Encounter  PROLACTIN Follow-up Disposition: Not on File

## 2019-02-11 NOTE — PROGRESS NOTES
Visit Vitals /70 Pulse 74 Temp 98.7 °F (37.1 °C) (Oral) Ht 5' 2.25\" (1.581 m) Wt 155 lb (70.3 kg) SpO2 99% BMI 28.12 kg/m² Patient states she doesn't receive the flu vaccine. 1. Have you been to the ER, urgent care clinic since your last visit? Hospitalized since your last visit? no 
 
2. Have you seen or consulted any other health care providers outside of the [address] [place] [address] since your last visit? Include any pap smears or colon screening.  no

## 2019-02-12 LAB — PROLACTIN SERPL-MCNC: 15.2 NG/ML (ref 4.8–23.3)

## 2020-03-10 ENCOUNTER — OFFICE VISIT (OUTPATIENT)
Dept: INTERNAL MEDICINE CLINIC | Age: 30
End: 2020-03-10

## 2020-03-10 VITALS
BODY MASS INDEX: 30.36 KG/M2 | HEIGHT: 62 IN | RESPIRATION RATE: 15 BRPM | DIASTOLIC BLOOD PRESSURE: 72 MMHG | OXYGEN SATURATION: 98 % | SYSTOLIC BLOOD PRESSURE: 101 MMHG | WEIGHT: 165 LBS | TEMPERATURE: 98.2 F | HEART RATE: 82 BPM

## 2020-03-10 DIAGNOSIS — L30.9 ECZEMA, UNSPECIFIED TYPE: Primary | ICD-10-CM

## 2020-03-10 RX ORDER — METHYLPREDNISOLONE 4 MG/1
TABLET ORAL
Qty: 1 DOSE PACK | Refills: 0 | Status: SHIPPED | OUTPATIENT
Start: 2020-03-10 | End: 2020-09-29 | Stop reason: ALTCHOICE

## 2020-03-10 RX ORDER — CETIRIZINE HCL 10 MG
10 TABLET ORAL
COMMUNITY
Start: 2020-03-10

## 2020-03-10 NOTE — PROGRESS NOTES
1. Have you been to the ER, urgent care clinic since your last visit? Hospitalized since your last visit? No    2. Have you seen or consulted any other health care providers outside of the [address] [place] [address] since your last visit? Include any pap smears or colon screening. No   Chief Complaint   Patient presents with    Rash     on chest for about  a week and a half.

## 2020-03-10 NOTE — PROGRESS NOTES
Nicole Otero is a 31 y.o. female  Chief Complaint   Patient presents with    Rash     on chest for about  a week and a half. Visit Vitals  /72 (BP 1 Location: Left arm, BP Patient Position: At rest)   Pulse 82   Temp 98.2 °F (36.8 °C) (Oral)   Resp 15   Ht 5' 2.25\" (1.581 m)   Wt 165 lb (74.8 kg)   SpO2 98%   BMI 29.94 kg/m²      Health Maintenance Due   Topic Date Due    Pneumococcal 0-64 years (1 of 3 - PCV13) 02/24/1995    DTaP/Tdap/Td series (2 - Td) 06/12/2017    PAP AKA CERVICAL CYTOLOGY  08/12/2018    Influenza Age 9 to Adult  08/01/2019       HPI  Wilma Story MD's patient in to see me today for an acute visit. Hx Eczema - often has a patch of this on her chest, but notes that this episode is severe. No known contacts with irritants. No new necklaces. Review of Systems   Constitutional: Negative for fever and malaise/fatigue. HENT: Negative for congestion. Respiratory: Negative for cough and shortness of breath. Cardiovascular: Negative for chest pain. Skin: Positive for itching and rash. Endo/Heme/Allergies: Negative for environmental allergies. Physical Exam  Vitals signs and nursing note reviewed. Constitutional:       General: She is not in acute distress. Appearance: She is well-developed. HENT:      Head: Normocephalic and atraumatic. Neck:      Musculoskeletal: Neck supple. Vascular: No JVD. Comments: No bruit bilateral carotid arteries. Cardiovascular:      Rate and Rhythm: Normal rate and regular rhythm. Heart sounds: Normal heart sounds. Pulmonary:      Effort: Pulmonary effort is normal. No respiratory distress. Breath sounds: Normal breath sounds. Skin:     General: Skin is warm and dry. Comments: Upper chest with red, dry lobular rash. Neurological:      Mental Status: She is alert and oriented to person, place, and time.    Psychiatric:         Mood and Affect: Mood normal.         Behavior: Behavior normal.         Thought Content: Thought content normal.         Judgment: Judgment normal.         1. Eczema, unspecified type  - methylPREDNISolone (MEDROL, LUÍS,) 4 mg tablet; Take as directed on dose pack. Dispense: 1 Dose Pack; Refill: 0  - cetirizine (ZYRTEC) 10 mg tablet; Take 1 Tab by mouth daily.

## 2020-09-29 ENCOUNTER — OFFICE VISIT (OUTPATIENT)
Dept: INTERNAL MEDICINE CLINIC | Age: 30
End: 2020-09-29
Payer: COMMERCIAL

## 2020-09-29 VITALS
DIASTOLIC BLOOD PRESSURE: 79 MMHG | WEIGHT: 171.2 LBS | BODY MASS INDEX: 31.5 KG/M2 | TEMPERATURE: 98.1 F | OXYGEN SATURATION: 100 % | HEART RATE: 80 BPM | HEIGHT: 62 IN | RESPIRATION RATE: 18 BRPM | SYSTOLIC BLOOD PRESSURE: 111 MMHG

## 2020-09-29 DIAGNOSIS — Z00.00 ROUTINE GENERAL MEDICAL EXAMINATION AT A HEALTH CARE FACILITY: Primary | ICD-10-CM

## 2020-09-29 DIAGNOSIS — Z00.00 ROUTINE GENERAL MEDICAL EXAMINATION AT A HEALTH CARE FACILITY: ICD-10-CM

## 2020-09-29 DIAGNOSIS — R21 RASH DUE TO ALLERGY: ICD-10-CM

## 2020-09-29 DIAGNOSIS — T78.40XA RASH DUE TO ALLERGY: ICD-10-CM

## 2020-09-29 LAB
25(OH)D3 SERPL-MCNC: 25.3 NG/ML (ref 30–100)
ALBUMIN SERPL-MCNC: 4 G/DL (ref 3.5–5)
ALBUMIN/GLOB SERPL: 1.1 {RATIO} (ref 1.1–2.2)
ALP SERPL-CCNC: 89 U/L (ref 45–117)
ALT SERPL-CCNC: 18 U/L (ref 12–78)
ANION GAP SERPL CALC-SCNC: 4 MMOL/L (ref 5–15)
AST SERPL-CCNC: 12 U/L (ref 15–37)
BASOPHILS # BLD: 0 K/UL (ref 0–0.1)
BASOPHILS NFR BLD: 0 % (ref 0–1)
BILIRUB SERPL-MCNC: 0.8 MG/DL (ref 0.2–1)
BUN SERPL-MCNC: 8 MG/DL (ref 6–20)
BUN/CREAT SERPL: 9 (ref 12–20)
CALCIUM SERPL-MCNC: 9.6 MG/DL (ref 8.5–10.1)
CHLORIDE SERPL-SCNC: 106 MMOL/L (ref 97–108)
CHOLEST SERPL-MCNC: 161 MG/DL
CO2 SERPL-SCNC: 29 MMOL/L (ref 21–32)
CREAT SERPL-MCNC: 0.88 MG/DL (ref 0.55–1.02)
DIFFERENTIAL METHOD BLD: ABNORMAL
EOSINOPHIL # BLD: 0.3 K/UL (ref 0–0.4)
EOSINOPHIL NFR BLD: 4 % (ref 0–7)
ERYTHROCYTE [DISTWIDTH] IN BLOOD BY AUTOMATED COUNT: 17 % (ref 11.5–14.5)
GLOBULIN SER CALC-MCNC: 3.8 G/DL (ref 2–4)
GLUCOSE SERPL-MCNC: 83 MG/DL (ref 65–100)
HCT VFR BLD AUTO: 38.5 % (ref 35–47)
HDLC SERPL-MCNC: 68 MG/DL
HDLC SERPL: 2.4 {RATIO} (ref 0–5)
HGB BLD-MCNC: 12.6 G/DL (ref 11.5–16)
IMM GRANULOCYTES # BLD AUTO: 0 K/UL (ref 0–0.04)
IMM GRANULOCYTES NFR BLD AUTO: 1 % (ref 0–0.5)
LDLC SERPL CALC-MCNC: 76.8 MG/DL (ref 0–100)
LIPID PROFILE,FLP: NORMAL
LYMPHOCYTES # BLD: 1.8 K/UL (ref 0.8–3.5)
LYMPHOCYTES NFR BLD: 23 % (ref 12–49)
MCH RBC QN AUTO: 23 PG (ref 26–34)
MCHC RBC AUTO-ENTMCNC: 32.7 G/DL (ref 30–36.5)
MCV RBC AUTO: 70.3 FL (ref 80–99)
MONOCYTES # BLD: 0.5 K/UL (ref 0–1)
MONOCYTES NFR BLD: 7 % (ref 5–13)
NEUTS SEG # BLD: 5.2 K/UL (ref 1.8–8)
NEUTS SEG NFR BLD: 67 % (ref 32–75)
NRBC # BLD: 0 K/UL (ref 0–0.01)
NRBC BLD-RTO: 0 PER 100 WBC
PLATELET # BLD AUTO: 264 K/UL (ref 150–400)
POTASSIUM SERPL-SCNC: 4.7 MMOL/L (ref 3.5–5.1)
PROT SERPL-MCNC: 7.8 G/DL (ref 6.4–8.2)
RBC # BLD AUTO: 5.48 M/UL (ref 3.8–5.2)
SODIUM SERPL-SCNC: 139 MMOL/L (ref 136–145)
TRIGL SERPL-MCNC: 81 MG/DL (ref ?–150)
TSH SERPL DL<=0.05 MIU/L-ACNC: 1.45 UIU/ML (ref 0.36–3.74)
VLDLC SERPL CALC-MCNC: 16.2 MG/DL
WBC # BLD AUTO: 7.8 K/UL (ref 3.6–11)

## 2020-09-29 PROCEDURE — 99395 PREV VISIT EST AGE 18-39: CPT | Performed by: INTERNAL MEDICINE

## 2020-09-29 NOTE — PROGRESS NOTES
Chief Complaint   Patient presents with    Physical         1. Have you been to the ER, urgent care clinic since your last visit? Hospitalized since your last visit? No    2. Have you seen or consulted any other health care providers outside of the [address] [place] [address] since your last visit? Include any pap smears or colon screening.  No

## 2020-09-29 NOTE — PATIENT INSTRUCTIONS
For left ear (wax). Debrox ear drops. For right ear (pain). Sweet Oil ear drops. Continue Flonase and add Zyrtec (cetirizine) 10mg daily.

## 2020-09-29 NOTE — PROGRESS NOTES
Subjective:   31 y.o. female for Well Woman Check. Her gyne and breast care is done elsewhere by her Ob-Gyne physician. 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 No cases of COVID. Wonders about having allergies testing. Breaks out on face and arms. Small bumps but not itchy. Wonders if secondary to dairy. No face, lip or tongue swelling. Right ear pain. Intermittent. No lumps or bumps. Front and below ears. Nothing makes it better or worse. Tugging on year may reduce pain. No decreased hearing or tinnitus. Twitch right eye. Eye doctor told her it was related to stress. Hates her job, [employer] is depressing her. In the process of finding a counselor. Graduated in April with Masters of HR. Hx of Hodgkins - October will be 3 years in remission. Has yearly follow up with oncologist, Hipolito Fowler, in October. No lumps or bumps. Up to date with gyn - [facility]. Does not want a flu shot. Patient Active Problem List    Diagnosis Date Noted    Hodgkin's [facility] ([facility] [address]     Herpes simplex infection of genitourinary system 02/28/2017    Environmental allergies 08/12/2015     Current Outpatient Medications   Medication Sig Dispense Refill    cetirizine (ZYRTEC) 10 mg tablet Take 1 Tab by mouth daily.  fluticasone (FLONASE) 50 mcg/actuation nasal spray 2 Sprays by Both Nostrils route daily. 1 Bottle 1    valACYclovir (VALTREX) 1 gram tablet TAKE 1 TABLET BY MOUTH FOR 7 DAYS WITH OUTBREAKS  5    ASPIRIN/ACETAMINOPHEN/CAFFEINE (EXCEDRIN EXTRA STRENGTH PO) Take 2 Tabs by mouth as needed. No Known Allergies  Past Medical History:   Diagnosis Date    Headache     Hodgkin's lymphoma ([facility] [address]      History reviewed. No pertinent surgical history.   Family History   Problem Relation Age of Onset    No Known Problems Mother     No Known Problems Father      Social History     Tobacco Use    Smoking status: Never Smoker    Smokeless tobacco: Never Used   Substance Use Topics  Alcohol use: Yes     Alcohol/week: 2.0 standard drinks     Types: 2 Shots of liquor per week     Comment: social        Lab Results   Component Value Date/Time    WBC 10.1 05/04/2017 11:35 AM    HGB 11.8 05/04/2017 11:35 AM    HCT 35.2 05/04/2017 11:35 AM    PLATELET 073 (H) 07/79/8275 11:35 AM    MCV 63 (L) 05/04/2017 11:35 AM     Lab Results   Component Value Date/Time    Glucose 75 02/28/2017 09:33 AM    Glucose POC 90mg/dl 02/21/2012 12:40 PM    LDL, calculated 69 05/05/2014 11:38 AM    Creatinine 0.66 02/28/2017 09:33 AM      Lab Results   Component Value Date/Time    Cholesterol, total 139 05/05/2014 11:38 AM    HDL Cholesterol 61 05/05/2014 11:38 AM    LDL, calculated 69 05/05/2014 11:38 AM    Triglyceride 46 05/05/2014 11:38 AM     Lab Results   Component Value Date/Time    ALT (SGPT) 10 02/28/2017 09:33 AM    Alk. phosphatase 93 02/28/2017 09:33 AM    Bilirubin, total 0.3 02/28/2017 09:33 AM    Albumin 3.9 02/28/2017 09:33 AM    Protein, total 7.0 02/28/2017 09:33 AM    PLATELET 128 (H) 53/02/9939 11:35 AM     Lab Results   Component Value Date/Time    GFR est non- 02/28/2017 09:33 AM    GFR est  02/28/2017 09:33 AM    Creatinine 0.66 02/28/2017 09:33 AM    BUN 8 02/28/2017 09:33 AM    Sodium 141 02/28/2017 09:33 AM    Potassium 4.2 02/28/2017 09:33 AM    Chloride 100 02/28/2017 09:33 AM    CO2 24 02/28/2017 09:33 AM       Specific concerns today: see above. Review of Systems  Constitutional: negative  Eyes: negative  Ears, nose, mouth, throat, and face: negative except for earaches and    Respiratory: negative  Cardiovascular: negative  Gastrointestinal: negative  Genitourinary:negative  Integument/breast: negative  Hematologic/lymphatic: negative  Musculoskeletal:negative  Neurological: negative except for headaches with menses - Excedrine relieves.     Behavioral/Psych: negative except for depression symptoms as above  Endocrine: negative  Allergic/Immunologic: negative except for allergies controlled with Flonase    Objective:   Blood pressure 111/79, pulse 80, temperature 98.1 °F (36.7 °C), temperature source Temporal, resp. rate 18, height 5' 2.25\" (1.581 m), weight 171 lb 3.2 oz (77.7 kg), SpO2 100 %. Physical Examination:   General appearance - alert, well appearing, and in no distress and oriented to person, place, and time  Mental status - alert, oriented to person, place, and time, normal mood, behavior, speech, dress, motor activity, and thought processes  Eyes - pupils equal and reactive, extraocular eye movements intact  Ears - right ear - ext canal wnl. Mild fullness without erythema of TM. Left ear - ceruman impaction obscuring TM  Nose - mucosal congestion and clear rhinorrhea  Mouth - mucous membranes moist, pharynx normal without lesions  Neck - supple, no significant adenopathy, carotids upstroke normal bilaterally, no bruits, thyroid exam: thyroid is normal in size without nodules or tenderness  Lymphatics - no palpable lymphadenopathy, no hepatosplenomegaly  Chest - clear to auscultation, no wheezes, rales or rhonchi, symmetric air entry  Heart - normal rate, regular rhythm, normal S1, S2, no murmurs, rubs, clicks or gallops  Abdomen - soft, nontender, nondistended, no masses or organomegaly  Breasts - breasts appear normal, no suspicious masses, no skin or nipple changes or axillary nodes  Back exam - full range of motion, no tenderness, palpable spasm or pain on motion  Neurological - alert, oriented, normal speech, no focal findings or movement disorder noted  Musculoskeletal - no joint tenderness, deformity or swelling  Extremities - peripheral pulses normal, no pedal edema, no clubbing or cyanosis  Skin - normal coloration and turgor, no rashes, no suspicious skin lesions noted     Assessment/Plan:   26yo female here for PE  Mild depression -situational secondary to pandemic, job. Will start with counseling.   No meds at this time but will call if needed  Hx Hodgkins  - no sign of recurrence. Oncology f/u next month  Recurrent rash - ? Food allergies. Discussed food diary. .  Check general and dairy food allergen profiles. HM - obesity - discussed diet and exercise for weight loss.     Orders Placed This Encounter    CBC WITH AUTOMATED DIFF    LIPID PANEL    METABOLIC PANEL, COMPREHENSIVE    TSH 3RD GENERATION    VITAMIN D, 25 HYDROXY    FOOD ALLERGY PROFILE    ALLERGEN PROFILE, FOOD-MILK

## 2020-10-01 DIAGNOSIS — T78.40XA RASH DUE TO ALLERGY: ICD-10-CM

## 2020-10-01 DIAGNOSIS — R21 RASH DUE TO ALLERGY: ICD-10-CM

## 2020-10-03 LAB
A-LACTALB IGE QN: <0.1 KU/L
CASEIN IGE QN: <0.1 KU/L
CHEDDAR IGE QN: <0.1 KU/L
CLAM IGE QN: <0.1 KU/L
CLASS DESCRIPTION, 600268: NORMAL
CLASS DESCRIPTION, 600268: NORMAL
CODFISH IGE QN: <0.1 KU/L
CORN IGE QN: <0.1 KU/L
COW MILK IGE QN: <0.1 KU/L
COW MILK IGE QN: <0.1 KU/L
EGG WHITE IGE QN: <0.1 KU/L
F082 BLUE CHEESE MOLD CHEESE: <0.1 KU/L
LACTOGLOB IGE QN: <0.1 KU/L
PEANUT IGE QN: <0.1 KU/L
SCALLOP IGE QN: <0.1 KU/L
SESAME SEED IGE QN: <0.1 KU/L
SHRIMP IGE QN: <0.1 KU/L
SOYBEAN IGE QN: <0.1 KU/L
WALNUT IGE QN: <0.1 KU/L
WHEAT IGE QN: <0.1 KU/L

## 2020-12-23 ENCOUNTER — TELEPHONE (OUTPATIENT)
Dept: INTERNAL MEDICINE CLINIC | Age: 30
End: 2020-12-23

## 2020-12-23 NOTE — TELEPHONE ENCOUNTER
Advised patient to stay quarantine and make sure she is not around anyone. Please do keep the mask on as well. Advised pateint that there is nothing we could do at this time. She will have to quarantine until she is negative. Advised patient every case is different. No set time or date for when when she will feel better.

## 2020-12-23 NOTE — TELEPHONE ENCOUNTER
Best contact number(s):[phone]        I tested positive last Monday the 14th and I went back to get another test and on the 21st I tested positve again. I need a negative test to go back to work and was wondering how or what I need to do to go about getting that. No current symptoms at this moment of Covid but has a lingering cough.        envera

## 2020-12-31 LAB — SARS-COV-2, NAA: NOT DETECTED

## 2021-03-02 NOTE — PROGRESS NOTES
HISTORY OF PRESENT ILLNESS  Merlinda Barbone is a 27 y.o. female. HPI  Nipple discomfort and discharge bialt since Saturday. Ovulated on Friday. Normally with have pain with ovulation but never discharge and never lasts this long. No masses she can feel in her breasts. Denies pregnancy. Discharge is white to tan. No blood. Current Outpatient Medications:     cetirizine (ZYRTEC) 10 mg tablet, Take 1 Tab by mouth daily. , Disp: , Rfl:     fluticasone (FLONASE) 50 mcg/actuation nasal spray, 2 Sprays by Both Nostrils route daily. , Disp: 1 Bottle, Rfl: 1    valACYclovir (VALTREX) 1 gram tablet, TAKE 1 TABLET BY MOUTH FOR 7 DAYS WITH OUTBREAKS, Disp: , Rfl: 5    ASPIRIN/ACETAMINOPHEN/CAFFEINE (EXCEDRIN EXTRA STRENGTH PO), Take 2 Tabs by mouth as needed. , Disp: , Rfl:     .  Visit Vitals  /64 (BP 1 Location: Left upper arm, BP Patient Position: Sitting, BP Cuff Size: Adult)   Pulse 87   Temp 97 °F (36.1 °C) (Temporal)   Resp 16   Ht 5' 2.24\" (1.581 m)   Wt 163 lb 6.4 oz (74.1 kg)   SpO2 98%   BMI 29.65 kg/m²       ROS  See above  Physical Exam  Vitals signs reviewed. Constitutional:       Appearance: Normal appearance. HENT:      Head: Normocephalic and atraumatic. Neurological:      Mental Status: She is alert. BREASTS - bilat no masses, symmetric.  bilat nipples tender but no discharge. No axillary LAD  ASSESSMENT and PLAN  bilat breast pain and discharge - check labs and imaging for cause. Pregnancy test in office negative.     Orders Placed This Encounter    MACKENZIE MAMMO BI DX INCL CAD    US BREASTS COMPLETE    PROLACTIN    METABOLIC PANEL, BASIC    TSH 3RD GENERATION    AMB POC URINE PREGNANCY TEST, VISUAL COLOR COMPARISON

## 2021-03-03 ENCOUNTER — OFFICE VISIT (OUTPATIENT)
Dept: INTERNAL MEDICINE CLINIC | Age: 31
End: 2021-03-03
Payer: COMMERCIAL

## 2021-03-03 VITALS
OXYGEN SATURATION: 98 % | SYSTOLIC BLOOD PRESSURE: 100 MMHG | HEART RATE: 87 BPM | BODY MASS INDEX: 30.07 KG/M2 | DIASTOLIC BLOOD PRESSURE: 64 MMHG | HEIGHT: 62 IN | RESPIRATION RATE: 16 BRPM | TEMPERATURE: 97 F | WEIGHT: 163.4 LBS

## 2021-03-03 DIAGNOSIS — N64.4 BREAST PAIN: ICD-10-CM

## 2021-03-03 DIAGNOSIS — N64.52 NIPPLE DISCHARGE: Primary | ICD-10-CM

## 2021-03-03 LAB
HCG URINE, QL. (POC): NEGATIVE
VALID INTERNAL CONTROL?: YES

## 2021-03-03 PROCEDURE — 81025 URINE PREGNANCY TEST: CPT | Performed by: INTERNAL MEDICINE

## 2021-03-03 PROCEDURE — 99213 OFFICE O/P EST LOW 20 MIN: CPT | Performed by: INTERNAL MEDICINE

## 2021-03-03 NOTE — PROGRESS NOTES
1. Have you been to the ER, urgent care clinic since your last visit? Hospitalized since your last visit? No    2. Have you seen or consulted any other health care providers outside of the [address] [place] [address] since your last visit? Include any pap smears or colon screening.  No   Chief Complaint   Patient presents with    Other     discharge and nipple pain from both breast

## 2021-03-08 ENCOUNTER — TELEPHONE (OUTPATIENT)
Dept: INTERNAL MEDICINE CLINIC | Age: 31
End: 2021-03-08

## 2021-03-08 DIAGNOSIS — N64.52 BREAST DISCHARGE: ICD-10-CM

## 2021-03-08 DIAGNOSIS — E22.1 HYPERPROLACTINEMIA (HCC): Primary | ICD-10-CM

## 2021-03-30 ENCOUNTER — HOSPITAL ENCOUNTER (OUTPATIENT)
Dept: MRI IMAGING | Age: 31
Discharge: HOME | End: 2021-03-30
Attending: INTERNAL MEDICINE
Payer: COMMERCIAL

## 2021-03-30 DIAGNOSIS — N64.52 BREAST DISCHARGE: ICD-10-CM

## 2021-03-30 DIAGNOSIS — E22.1 HYPERPROLACTINEMIA (HCC): ICD-10-CM

## 2021-03-30 PROCEDURE — 70553 MRI BRAIN STEM W/O & W/DYE: CPT | Performed by: INTERNAL MEDICINE

## 2021-03-30 RX ORDER — GADOTERATE MEGLUMINE 376.9 MG/ML
14 INJECTION INTRAVENOUS
Status: COMPLETED | OUTPATIENT
Start: 2021-03-30 | End: 2021-03-30

## 2021-03-30 RX ADMIN — GADOTERATE MEGLUMINE 14 ML: 376.9 INJECTION INTRAVENOUS at 10:00

## 2021-05-03 ENCOUNTER — VIRTUAL VISIT (OUTPATIENT)
Dept: INTERNAL MEDICINE CLINIC | Age: 31
End: 2021-05-03
Payer: COMMERCIAL

## 2021-05-03 DIAGNOSIS — F41.9 ANXIETY: Primary | ICD-10-CM

## 2021-05-03 PROCEDURE — 99213 OFFICE O/P EST LOW 20 MIN: CPT | Performed by: INTERNAL MEDICINE

## 2021-05-03 RX ORDER — ALPRAZOLAM 0.5 MG/1
.25-.5 TABLET ORAL 2 TIMES DAILY
Qty: 30 TAB | Refills: 0 | Status: SHIPPED | OUTPATIENT
Start: 2021-05-03 | End: 2022-02-08

## 2021-05-03 NOTE — PROGRESS NOTES
1. Have you been to the ER, urgent care clinic since your last visit? Hospitalized since your last visit? No    2. Have you seen or consulted any other health care providers outside of the [address] [place] [address] since your last visit? Include any pap smears or colon screening.  No   Chief Complaint   Patient presents with    Anxiety     Please send link to [phone]

## 2021-05-03 NOTE — LETTER
NOTIFICATION RETURN TO WORK / SCHOOL 
 
5/3/2021 1:12 PM 
 
[facility]os [address] [facility] [place] To Whom It May Concern: 
 
Kaye Cisneros is currently under the care of Lian Mcbride.. Please excuse from work May 5, 2021 through May 13, 2021. If there are questions or concerns please have the patient contact our office. Sincerely, Ericka Valentino MD
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
## 2021-12-29 ENCOUNTER — OFFICE VISIT (OUTPATIENT)
Dept: INTERNAL MEDICINE CLINIC | Age: 31
End: 2021-12-29
Payer: COMMERCIAL

## 2021-12-29 VITALS
HEIGHT: 60 IN | SYSTOLIC BLOOD PRESSURE: 105 MMHG | BODY MASS INDEX: 34 KG/M2 | OXYGEN SATURATION: 98 % | HEART RATE: 92 BPM | DIASTOLIC BLOOD PRESSURE: 71 MMHG | RESPIRATION RATE: 14 BRPM | WEIGHT: 173.2 LBS | TEMPERATURE: 98.3 F

## 2021-12-29 DIAGNOSIS — L60.3 NAIL BREAKING: Primary | ICD-10-CM

## 2021-12-29 PROCEDURE — 99212 OFFICE O/P EST SF 10 MIN: CPT | Performed by: INTERNAL MEDICINE

## 2021-12-29 NOTE — PROGRESS NOTES
Satish Braswell is a 27 y.o. female  Chief Complaint   Patient presents with    Nail Problem     pt c/o toe nail came off a month ago, pt wants to make sure it will grow back      Visit Vitals  /71 (BP 1 Location: Left upper arm, BP Patient Position: Sitting, BP Cuff Size: Adult)   Pulse 92   Temp 98.3 °F (36.8 °C) (Temporal)   Resp 14   Ht 5' (1.524 m)   Wt 173 lb 3.2 oz (78.6 kg)   SpO2 98%   BMI 33.83 kg/m²      Health Maintenance Due   Topic Date Due    Hepatitis C Screening  Never done    Pneumococcal 0-64 years (1 of 4 - PCV13) Never done    COVID-19 Vaccine (1) Never done    DTaP/Tdap/Td series (2 - Td or Tdap) 06/12/2017    Cervical cancer screen  08/12/2020    Flu Vaccine (1) Never done       HPI  Ms. Germaine Perez presents wit a month duration of left toe nail problem. She saw her nail broke off a month ago after she had a new nail polish. A month latter, it is not back to its original form. She denies pain, erythema, fever, chills. Past Medical History:   Diagnosis Date    Headache     Hodgkin's lymphoma ([facility] [address]       No Known Allergies       ROS  Review of Systems   All other systems reviewed and are negative. EXAM  Physical Exam  Vitals and nursing note reviewed. Constitutional:       Appearance: Normal appearance. HENT:      Head: Normocephalic and atraumatic. Musculoskeletal:        Feet:    Feet:      Comments: Normal nail with uneven edges  Neurological:      Mental Status: She is alert. ASSESSMENT/PLAN  Encounter Diagnoses     ICD-10-CM ICD-9-CM   1.  Nail breaking  L60.3 703.8    -reassurance            Jefe Cummings MD

## 2021-12-29 NOTE — PROGRESS NOTES
Reviewed record in preparation for visit and have obtained necessary documentation. Identified pt with two pt identifiers(name and ). Chief Complaint   Patient presents with    Nail Problem     pt c/o toe nail came off a month ago, pt wants to make sure it will grow back      Blood pressure 105/71, pulse 92, temperature 98.3 °F (36.8 °C), temperature source Temporal, resp. rate 14, height 5' (1.524 m), weight 173 lb 3.2 oz (78.6 kg), last menstrual period 2021, SpO2 98 %. Health Maintenance Due   Topic Date Due    Hepatitis C Test  Never done    Pneumococcal Vaccine (1 of 4 - PCV13) Never done    COVID-19 Vaccine (1) Never done    DTaP/Tdap/Td  (2 - Td or Tdap) 2017    Cervical cancer screen  2020    Yearly Flu Vaccine (1) Never done       Ms. Eulalia Wilson has a reminder for a \"due or due soon\" health maintenance. I have asked that she discuss this further with her primary care provider for follow-up on this health maintenance. Coordination of Care Questionnaire:  :     1) Have you been to an emergency room, urgent care clinic since your last visit? yes [facility]   Hospitalized since your last visit? no             2) Have you seen or consulted any other health care providers outside of [address] [place] [address] since your last visit? no  (Include any pap smears or colon screenings in this section.)    3) In the event something were to happen to you and you were unable to speak on your behalf, do you have an Advance Directive/ Living Will in place stating your wishes?  NO

## 2022-02-08 ENCOUNTER — VIRTUAL VISIT (OUTPATIENT)
Dept: INTERNAL MEDICINE CLINIC | Age: 32
End: 2022-02-08
Payer: COMMERCIAL

## 2022-02-08 DIAGNOSIS — R11.0 NAUSEA: Primary | ICD-10-CM

## 2022-02-08 DIAGNOSIS — U07.1 COVID-19: ICD-10-CM

## 2022-02-08 PROCEDURE — 99213 OFFICE O/P EST LOW 20 MIN: CPT | Performed by: PHYSICIAN ASSISTANT

## 2022-02-08 RX ORDER — ONDANSETRON 4 MG/1
4 TABLET, ORALLY DISINTEGRATING ORAL
Qty: 12 TABLET | Refills: 0 | Status: SHIPPED | OUTPATIENT
Start: 2022-02-08 | End: 2022-03-04 | Stop reason: ALTCHOICE

## 2022-02-11 PROBLEM — Z85.71 HISTORY OF HODGKIN'S LYMPHOMA: Status: ACTIVE | Noted: 2022-02-11

## 2022-03-04 ENCOUNTER — OFFICE VISIT (OUTPATIENT)
Dept: INTERNAL MEDICINE CLINIC | Age: 32
End: 2022-03-04
Payer: COMMERCIAL

## 2022-03-04 VITALS
DIASTOLIC BLOOD PRESSURE: 80 MMHG | HEIGHT: 60 IN | HEART RATE: 101 BPM | SYSTOLIC BLOOD PRESSURE: 107 MMHG | WEIGHT: 172 LBS | BODY MASS INDEX: 33.77 KG/M2 | TEMPERATURE: 98 F | RESPIRATION RATE: 20 BRPM

## 2022-03-04 DIAGNOSIS — J02.9 SORE THROAT: Primary | ICD-10-CM

## 2022-03-04 DIAGNOSIS — Z11.3 SCREENING EXAMINATION FOR STD (SEXUALLY TRANSMITTED DISEASE): ICD-10-CM

## 2022-03-04 LAB
HIV 1+2 AB+HIV1 P24 AG SERPL QL IA: NONREACTIVE
HIV12 RESULT COMMENT, HHIVC: NORMAL
S PYO AG THROAT QL: POSITIVE
VALID INTERNAL CONTROL?: YES

## 2022-03-04 PROCEDURE — 87880 STREP A ASSAY W/OPTIC: CPT | Performed by: PHYSICIAN ASSISTANT

## 2022-03-04 PROCEDURE — 99213 OFFICE O/P EST LOW 20 MIN: CPT | Performed by: PHYSICIAN ASSISTANT

## 2022-03-04 RX ORDER — AMOXICILLIN 875 MG/1
875 TABLET, FILM COATED ORAL 2 TIMES DAILY
Qty: 20 TABLET | Refills: 0 | Status: SHIPPED | OUTPATIENT
Start: 2022-03-04 | End: 2022-05-05

## 2022-03-04 NOTE — PROGRESS NOTES
1. \"Have you been to the ER, urgent care clinic since your last visit? Hospitalized since your last visit? \" NO    2. \"Have you seen or consulted any other health care providers outside of the [address] [place] [address] since your last visit? \" No    3. For patients aged 39-70: Has the patient had a colonoscopy / FIT/ Cologuard? If the patient is female:    4. For patients aged 41-77: Has the patient had a mammogram within the past 2 years? 5. For patients aged 21-65: Has the patient had a pap smear? No    Health Maintenance Due   Topic Date Due    Hepatitis C Screening  Never done    DTaP/Tdap/Td series (2 - Td or Tdap) 06/12/2017    Cervical cancer screen  08/12/2020    Flu Vaccine (1) Never done     Patient here today for std testing.

## 2022-03-04 NOTE — PROGRESS NOTES
Chief Complaint   Patient presents with    Follow-up     she is a 34y.o. year old female who presents for evaluation. Patient presents for a couple of concerns. She reports she has history of hodgkin's lymphoma. She states she saw [facility] previous for pain of her left side of her neck. She states she was treated for an ear infection but states she did not get the medication. She had asked for a cbc but she does not know the results yet. She reports she has continued to have pain of her left lymph nodes and reports her throat is sore. Also today she would like to get STD testing. She has not been having symptoms but recent broke up with her boyfriend. Reviewed PmHx, RxHx, FmHx, SocHx, AllgHx and updated and dated in the chart. Review of Systems - negative except as listed above    Objective:     Vitals:    03/04/22 0753   BP: 107/80   Pulse: (!) 101   Resp: 20   Temp: 98 °F (36.7 °C)   TempSrc: Temporal   Weight: 172 lb (78 kg)   Height: 5' (1.524 m)     Physical Examination: General appearance - alert, well appearing, and in no distress  Mental status - normal mood, behavior, speech, dress, motor activity, and thought processes  Ears - bilateral TM's and external ear canals normal  Mouth - erythematous  Neck - mild tenderness left cervical lymph nodes  Not able to appreciate edematous supraclavicular lymph nodes        Assessment/ Plan:   Diagnoses and all orders for this visit:    1. Sore throat  -     AMB POC RAPID STREP A  -     CULTURE, THROAT; Future  -     amoxicillin (AMOXIL) 875 mg tablet; Take 1 Tablet by mouth two (2) times a day. 2. Screening examination for STD (sexually transmitted disease)  -     CT+NG+TV+HSV BY CHANDNI; Future  -     RPR; Future  -     HIV 1/2 AG/AB, 4TH GENERATION,W RFLX CONFIRM; Future       patient rapid throat swab was positive for strep. I have sent medication for her to take. I have advised her if the pain does not resolve she is to follow up.  Also patient will be contact with the results of her std testing once back. I have discussed the diagnosis with the patient and the intended plan as seen in the above orders. The patient has received an after-visit summary and questions were answered concerning future plans.      Medication Side Effects and Warnings were discussed with patient: yes  Patient Labs were reviewed and or requested: yes  Patient Past Records were reviewed and or requested  yes    Marguerite Albright PA-C

## 2022-03-06 LAB — RPR SER QL: NONREACTIVE

## 2022-03-07 LAB
BACTERIA SPEC CULT: NORMAL
SERVICE CMNT-IMP: NORMAL

## 2022-03-08 ENCOUNTER — PATIENT MESSAGE (OUTPATIENT)
Dept: INTERNAL MEDICINE CLINIC | Age: 32
End: 2022-03-08

## 2022-03-08 DIAGNOSIS — Z20.2 POSSIBLE EXPOSURE TO STD: Primary | ICD-10-CM

## 2022-03-18 PROBLEM — A60.00 HERPES SIMPLEX INFECTION OF GENITOURINARY SYSTEM: Status: ACTIVE | Noted: 2017-02-28

## 2022-03-19 PROBLEM — Z85.71 HISTORY OF HODGKIN'S LYMPHOMA: Status: ACTIVE | Noted: 2022-02-11

## 2022-05-05 ENCOUNTER — VIRTUAL VISIT (OUTPATIENT)
Dept: INTERNAL MEDICINE CLINIC | Age: 32
End: 2022-05-05
Payer: COMMERCIAL

## 2022-05-05 DIAGNOSIS — J02.9 SORE THROAT: Primary | ICD-10-CM

## 2022-05-05 DIAGNOSIS — J30.89 ENVIRONMENTAL AND SEASONAL ALLERGIES: ICD-10-CM

## 2022-05-05 PROCEDURE — 99213 OFFICE O/P EST LOW 20 MIN: CPT | Performed by: PHYSICIAN ASSISTANT

## 2022-05-05 NOTE — PROGRESS NOTES
1. \"Have you been to the ER, urgent care clinic since your last visit? Hospitalized since your last visit? \"  no    2. \"Have you seen or consulted any other health care providers outside of the [address] [place] [address] since your last visit? \" No    3. For patients aged 39-70: Has the patient had a colonoscopy / FIT/ Cologuard? No      If the patient is female:    4. For patients aged 41-77: Has the patient had a mammogram within the past 2 years? No      5. For patients aged 21-65: Has the patient had a pap smear?  No    Health Maintenance Due   Topic Date Due    Hepatitis C Screening  Never done    DTaP/Tdap/Td series (2 - Td or Tdap) 06/12/2017    Cervical cancer screen  08/12/2020     Patient here today with symptoms of cough, sore throat,

## 2022-05-06 NOTE — PROGRESS NOTES
Fer Fischer is a 34 y.o. female who was seen by synchronous (real-time) audio-video technology on 5/5/2022 for Concern For COVID-19 (Coronavirus)        Assessment & Plan:   Diagnoses and all orders for this visit:    1. Sore throat    2. Environmental and seasonal allergies    I explained to the patient that her sore throat could be due to drainage. I would like for her to take zyrtec, claritin or allegra. She is to follow up if not better. I spent at least 20 minutes on this visit with this established patient. 712  Subjective:   Patient presents for a sore throat that started on Friday. She reports she believed it may be allergy related. She has drinking hot tea. She denies fever or sore neck. She has been taking benadryl. Prior to Admission medications    Medication Sig Start Date End Date Taking? Authorizing Provider   cetirizine (ZYRTEC) 10 mg tablet Take 10 mg by mouth daily as needed. 3/10/20  Yes Nhan Lewis PA-C   fluticasone (FLONASE) 50 mcg/actuation nasal spray 2 Sprays by Both Nostrils route daily. Patient taking differently: 2 Sprays by Both Nostrils route daily as needed. 1/22/19  Yes Marguerite Albright PA-C   valACYclovir (VALTREX) 1 gram tablet Take 1,000 mg by mouth daily as needed. 2/23/17  Yes Provider, Historical   ASPIRIN/ACETAMINOPHEN/CAFFEINE (EXCEDRIN EXTRA STRENGTH PO) Take 2 Tabs by mouth as needed.    Yes Provider, Historical     No Known Allergies    ROS  See above  Objective:     Patient-Reported Vitals 5/5/2022   Patient-Reported Weight 170   Patient-Reported Height 52   Patient-Reported LMP -      General: alert, cooperative, no distress   Mental  status: normal mood, behavior, speech, dress, motor activity, and thought processes, able to follow commands   HENT: NCAT   Neck: no visualized mass   Resp: no respiratory distress   Neuro: no gross deficits   Skin: no discoloration or lesions of concern on visible areas   Psychiatric: normal affect, consistent with stated mood, no evidence of hallucinations     Additional exam findings: We discussed the expected course, resolution and complications of the diagnosis(es) in detail. Medication risks, benefits, costs, interactions, and alternatives were discussed as indicated. I advised her to contact the office if her condition worsens, changes or fails to improve as anticipated. She expressed understanding with the diagnosis(es) and plan. Rodolfo Stahl, was evaluated through a synchronous (real-time) audio-video encounter. The patient (or guardian if applicable) is aware that this is a billable service, which includes applicable co-pays. Verbal consent to proceed has been obtained. The visit was conducted pursuant to the emergency declaration under the [address] [place] [address] authority and the [employer] General Act. Patient identification was verified, and a caregiver was present when appropriate. The patient was located at home in a state where the provider was licensed to provide care.     Steve Holter Lumpkin, PA-C

## 2022-06-24 ENCOUNTER — PATIENT MESSAGE (OUTPATIENT)
Dept: INTERNAL MEDICINE CLINIC | Age: 32
End: 2022-06-24

## 2022-06-24 NOTE — TELEPHONE ENCOUNTER
Referral signed by Aiyana Pacheco   I have placed the referral in the mail as requested by the patient

## 2024-08-21 SDOH — HEALTH STABILITY: PHYSICAL HEALTH: ON AVERAGE, HOW MANY MINUTES DO YOU ENGAGE IN EXERCISE AT THIS LEVEL?: 50 MIN

## 2024-08-21 SDOH — HEALTH STABILITY: PHYSICAL HEALTH: ON AVERAGE, HOW MANY DAYS PER WEEK DO YOU ENGAGE IN MODERATE TO STRENUOUS EXERCISE (LIKE A BRISK WALK)?: 4 DAYS

## 2024-08-22 ENCOUNTER — OFFICE VISIT (OUTPATIENT)
Age: 34
End: 2024-08-22

## 2024-08-22 VITALS
RESPIRATION RATE: 16 BRPM | TEMPERATURE: 97.6 F | HEIGHT: 62 IN | DIASTOLIC BLOOD PRESSURE: 76 MMHG | SYSTOLIC BLOOD PRESSURE: 109 MMHG | WEIGHT: 181 LBS | BODY MASS INDEX: 33.31 KG/M2 | OXYGEN SATURATION: 98 % | HEART RATE: 88 BPM

## 2024-08-22 DIAGNOSIS — E66.9 OBESITY (BMI 30-39.9): ICD-10-CM

## 2024-08-22 DIAGNOSIS — Z76.89 ENCOUNTER TO ESTABLISH CARE: Primary | ICD-10-CM

## 2024-08-22 DIAGNOSIS — Z85.71 HISTORY OF HODGKIN'S LYMPHOMA: ICD-10-CM

## 2024-08-22 DIAGNOSIS — Z00.00 LABORATORY EXAMINATION ORDERED AS PART OF A COMPLETE PHYSICAL EXAMINATION: ICD-10-CM

## 2024-08-22 DIAGNOSIS — S99.912D INJURY OF LEFT ANKLE, SUBSEQUENT ENCOUNTER: ICD-10-CM

## 2024-08-22 DIAGNOSIS — M25.572 ACUTE LEFT ANKLE PAIN: ICD-10-CM

## 2024-08-22 DIAGNOSIS — R71.8 ABNORMAL RED BLOOD CELLS: Primary | ICD-10-CM

## 2024-08-22 LAB
ALBUMIN SERPL-MCNC: 4 G/DL (ref 3.5–5)
ALBUMIN/GLOB SERPL: 1.1 (ref 1.1–2.2)
ALP SERPL-CCNC: 86 U/L (ref 45–117)
ALT SERPL-CCNC: 23 U/L (ref 12–78)
ANION GAP SERPL CALC-SCNC: 3 MMOL/L (ref 5–15)
AST SERPL-CCNC: 18 U/L (ref 15–37)
BASOPHILS # BLD: 0.1 K/UL (ref 0–0.1)
BASOPHILS NFR BLD: 1 % (ref 0–1)
BILIRUB SERPL-MCNC: 0.4 MG/DL (ref 0.2–1)
BUN SERPL-MCNC: 14 MG/DL (ref 6–20)
BUN/CREAT SERPL: 15 (ref 12–20)
CALCIUM SERPL-MCNC: 9.5 MG/DL (ref 8.5–10.1)
CHLORIDE SERPL-SCNC: 104 MMOL/L (ref 97–108)
CHOLEST SERPL-MCNC: 160 MG/DL
CO2 SERPL-SCNC: 31 MMOL/L (ref 21–32)
CREAT SERPL-MCNC: 0.91 MG/DL (ref 0.55–1.02)
DIFFERENTIAL METHOD BLD: ABNORMAL
EOSINOPHIL # BLD: 0.2 K/UL (ref 0–0.4)
EOSINOPHIL NFR BLD: 3 % (ref 0–7)
ERYTHROCYTE [DISTWIDTH] IN BLOOD BY AUTOMATED COUNT: 18 % (ref 11.5–14.5)
EST. AVERAGE GLUCOSE BLD GHB EST-MCNC: 97 MG/DL
GLOBULIN SER CALC-MCNC: 3.5 G/DL (ref 2–4)
GLUCOSE SERPL-MCNC: 90 MG/DL (ref 65–100)
HBA1C MFR BLD: 5 % (ref 4–5.6)
HCT VFR BLD AUTO: 37.9 % (ref 35–47)
HDLC SERPL-MCNC: 74 MG/DL
HDLC SERPL: 2.2 (ref 0–5)
HGB BLD-MCNC: 12.7 G/DL (ref 11.5–16)
IMM GRANULOCYTES # BLD AUTO: 0.1 K/UL (ref 0–0.04)
IMM GRANULOCYTES NFR BLD AUTO: 1 % (ref 0–0.5)
LDLC SERPL CALC-MCNC: 78 MG/DL (ref 0–100)
LYMPHOCYTES # BLD: 1.8 K/UL (ref 0.8–3.5)
LYMPHOCYTES NFR BLD: 25 % (ref 12–49)
MCH RBC QN AUTO: 23.2 PG (ref 26–34)
MCHC RBC AUTO-ENTMCNC: 33.5 G/DL (ref 30–36.5)
MCV RBC AUTO: 69.2 FL (ref 80–99)
MONOCYTES # BLD: 0.4 K/UL (ref 0–1)
MONOCYTES NFR BLD: 6 % (ref 5–13)
NEUTS SEG # BLD: 4.5 K/UL (ref 1.8–8)
NEUTS SEG NFR BLD: 64 % (ref 32–75)
NRBC # BLD: 0 K/UL (ref 0–0.01)
NRBC BLD-RTO: 0 PER 100 WBC
PLATELET # BLD AUTO: 272 K/UL (ref 150–400)
POTASSIUM SERPL-SCNC: 4.6 MMOL/L (ref 3.5–5.1)
PROT SERPL-MCNC: 7.5 G/DL (ref 6.4–8.2)
RBC # BLD AUTO: 5.48 M/UL (ref 3.8–5.2)
RBC MORPH BLD: ABNORMAL
SODIUM SERPL-SCNC: 138 MMOL/L (ref 136–145)
TRIGL SERPL-MCNC: 40 MG/DL
VLDLC SERPL CALC-MCNC: 8 MG/DL
WBC # BLD AUTO: 7.1 K/UL (ref 3.6–11)

## 2024-08-22 SDOH — ECONOMIC STABILITY: FOOD INSECURITY: WITHIN THE PAST 12 MONTHS, THE FOOD YOU BOUGHT JUST DIDN'T LAST AND YOU DIDN'T HAVE MONEY TO GET MORE.: NEVER TRUE

## 2024-08-22 SDOH — ECONOMIC STABILITY: FOOD INSECURITY: WITHIN THE PAST 12 MONTHS, YOU WORRIED THAT YOUR FOOD WOULD RUN OUT BEFORE YOU GOT MONEY TO BUY MORE.: NEVER TRUE

## 2024-08-22 SDOH — ECONOMIC STABILITY: INCOME INSECURITY: HOW HARD IS IT FOR YOU TO PAY FOR THE VERY BASICS LIKE FOOD, HOUSING, MEDICAL CARE, AND HEATING?: NOT HARD AT ALL

## 2024-08-22 ASSESSMENT — PATIENT HEALTH QUESTIONNAIRE - PHQ9
2. FEELING DOWN, DEPRESSED OR HOPELESS: NOT AT ALL
1. LITTLE INTEREST OR PLEASURE IN DOING THINGS: NOT AT ALL
SUM OF ALL RESPONSES TO PHQ QUESTIONS 1-9: 0
SUM OF ALL RESPONSES TO PHQ9 QUESTIONS 1 & 2: 0

## 2024-08-22 ASSESSMENT — ENCOUNTER SYMPTOMS
BACK PAIN: 0
SORE THROAT: 0
BLOOD IN STOOL: 0
COUGH: 0
EYE PAIN: 0
ABDOMINAL PAIN: 0
DIARRHEA: 0
CONSTIPATION: 0
NAUSEA: 0
SINUS PAIN: 0
SINUS PRESSURE: 0
VOMITING: 0
SHORTNESS OF BREATH: 0

## 2024-08-22 NOTE — PROGRESS NOTES
I have reviewed all needed documentation in preparation for visit. Verified patient by name and date of birth  Chief Complaint   Patient presents with    Establish Care     3 wks ago Sprain ankle , went to [facility] and still in pain Lt ankle , swelling went down a little bit        There were no vitals filed for this visit.    Health Maintenance Due   Topic Date Due    Depression Screen  Never done    Varicella vaccine (1 of 2 - 13+ 2-dose series) Never done    Hepatitis C screen  Never done    Hepatitis B vaccine (1 of 3 - 19+ 3-dose series) Never done    DTaP/Tdap/Td vaccine (2 - Td or Tdap) 06/12/2017    Cervical cancer screen  08/12/2020    COVID-19 Vaccine (4 - 2023-24 season) 09/01/2023    Flu vaccine (1) Never done     \"Have you been to the ER, urgent care clinic since your last visit?  Hospitalized since your last visit?\"    NO    “Have you seen or consulted any other health care providers outside of [facility] since your last visit?”    YES - When: approximately 3  weeks ago.  Where and Why: [facility] .     “Have you had a pap smear?”    YES - Where: 7/2024 [facility]      Nurse/CMA to request most recent records if not in the chart    Date of last Cervical Cancer screen (HPV or PAP): 8/12/2015             Click Here for Release of Records Request         Sierra ESPINOSA

## 2024-08-22 NOTE — PROGRESS NOTES
Eve Pérez is a 34 y.o. female  Chief Complaint   Patient presents with    Establish Care     3 wks ago Sprain ankle , went to [facility] and still in pain Lt ankle , swelling went down a little bit        Vitals:    08/22/24 0815   BP: 109/76   Pulse: 88   Resp: 16   Temp: 97.6 °F (36.4 °C)   SpO2: 98%          HPI  The patient is establishing care and presents for a wellness evaluation and ankle pain. Two weeks ago, the patient fell and was evaluated at [facility], where an X-ray was performed, and an ankle brace was applied. Despite this, the patient continues to experience aching and tenderness in the ankle, with a pain level of 4/10.  The patient has a history of Hodgkin’s lymphoma, for which they underwent chemoradiation therapy 7 years ago. They see an OB/GYN regularly, with recent normal Pap smears.  The patient exercises regularly, working out 3-4 times a week, and follows a healthy diet but is trying to lose weight. They have difficulty with chips and would like a referral to a nutritionist. The patient’s  questions if they are consuming enough food.    Past Medical History:   Diagnosis Date    Headache     Hodgkin's lymphoma (HCC)             ROS  Review of Systems   Constitutional:  Negative for fever and unexpected weight change.   HENT:  Negative for congestion, sinus pressure, sinus pain and sore throat.    Eyes:  Negative for pain and visual disturbance.   Respiratory:  Negative for cough and shortness of breath.    Cardiovascular:  Negative for chest pain, palpitations and leg swelling.   Gastrointestinal:  Negative for abdominal pain, blood in stool, constipation, diarrhea, nausea and vomiting.   Endocrine: Negative for polydipsia.   Genitourinary:  Negative for difficulty urinating, dysuria, frequency, hematuria and urgency.   Musculoskeletal:  Negative for back pain, gait problem and neck pain.   Skin:  Negative for rash.   Allergic/Immunologic: Negative for immunocompromised state.

## 2024-09-03 ENCOUNTER — HOSPITAL ENCOUNTER (OUTPATIENT)
Age: 34
Discharge: HOME | End: 2024-09-06
Payer: COMMERCIAL

## 2024-09-03 DIAGNOSIS — M25.572 ACUTE LEFT ANKLE PAIN: ICD-10-CM

## 2024-09-03 DIAGNOSIS — S99.912D INJURY OF LEFT ANKLE, SUBSEQUENT ENCOUNTER: ICD-10-CM

## 2024-09-03 PROCEDURE — 73610 X-RAY EXAM OF ANKLE: CPT

## 2024-09-05 ENCOUNTER — HOSPITAL ENCOUNTER (OUTPATIENT)
Facility: HOSPITAL | Age: 34
Setting detail: RECURRING SERIES
Discharge: HOME | End: 2024-09-08
Attending: INTERNAL MEDICINE
Payer: COMMERCIAL

## 2024-09-05 PROCEDURE — 97802 MEDICAL NUTRITION INDIV IN: CPT

## 2024-11-06 ENCOUNTER — OFFICE VISIT (OUTPATIENT)
Age: 34
End: 2024-11-06
Payer: COMMERCIAL

## 2024-11-06 VITALS
HEART RATE: 89 BPM | RESPIRATION RATE: 18 BRPM | WEIGHT: 179 LBS | HEIGHT: 62 IN | OXYGEN SATURATION: 98 % | DIASTOLIC BLOOD PRESSURE: 78 MMHG | TEMPERATURE: 97.5 F | BODY MASS INDEX: 32.94 KG/M2 | SYSTOLIC BLOOD PRESSURE: 106 MMHG

## 2024-11-06 DIAGNOSIS — L30.4 INTERTRIGO: Primary | ICD-10-CM

## 2024-11-06 PROCEDURE — 99213 OFFICE O/P EST LOW 20 MIN: CPT | Performed by: INTERNAL MEDICINE

## 2024-11-06 RX ORDER — CLOTRIMAZOLE AND BETAMETHASONE DIPROPIONATE 10; .64 MG/G; MG/G
CREAM TOPICAL
Qty: 45 G | Refills: 0 | Status: SHIPPED | OUTPATIENT
Start: 2024-11-06

## 2024-11-06 ASSESSMENT — ENCOUNTER SYMPTOMS
COUGH: 0
SHORTNESS OF BREATH: 0

## 2024-11-06 NOTE — PROGRESS NOTES
I have reviewed all needed documentation in preparation for visit. Verified patient by name and date of birth  Chief Complaint   Patient presents with    Rash     Underneath Breast        There were no vitals filed for this visit.    Health Maintenance Due   Topic Date Due    Varicella vaccine (1 of 2 - 13+ 2-dose series) Never done    Hepatitis C screen  Never done    Hepatitis B vaccine (1 of 3 - 19+ 3-dose series) Never done    DTaP/Tdap/Td vaccine (2 - Td or Tdap) 06/12/2017    Cervical cancer screen  08/12/2020    Flu vaccine (1) Never done    COVID-19 Vaccine (4 - 2023-24 season) 09/01/2024     \"Have you been to the ER, urgent care clinic since your last visit?  Hospitalized since your last visit?\"    NO    “Have you seen or consulted any other health care providers outside of [facility] since your last visit?”    No    “Have you had a pap smear?”    YES - [facility]      Nurse/CMA to request most recent records if not in the chart    Date of last Cervical Cancer screen (HPV or PAP): 8/12/2015             Click Here for Release of Records Request         Sierra Garg [facility]

## 2024-11-06 NOTE — PROGRESS NOTES
Eve Pérez is a 34 y.o. female  Chief Complaint   Patient presents with    Rash     Underneath Breast Bilateral , started 2 wks ago ;       Vitals:    11/06/24 0802   BP: 106/78   Pulse: 89   Resp: 18   Temp: 97.5 °F (36.4 °C)   SpO2: 98%          HPI  Ms.Kristen CELINE Pérez presents with a hyperpigmented rash under her breast, which has been present for the last two weeks. The rash is not associated with itching, pain, or fever, but it is spreading. The patient has attempted to contact Dermatology for an appointment, but has been unsuccessful in reaching them         .  Past Medical History:   Diagnosis Date    Headache     Hodgkin's lymphoma (HCC)             ROS  Review of Systems   Constitutional:  Negative for fever.   Respiratory:  Negative for cough and shortness of breath.    Cardiovascular:  Negative for chest pain and palpitations.   Skin:  Positive for rash.   Neurological:  Negative for headaches.   Psychiatric/Behavioral:  Negative for dysphoric mood.            EXAM  Physical Exam  Vitals reviewed.   Constitutional:       Appearance: Normal appearance.   HENT:      Head: Normocephalic and atraumatic.   Cardiovascular:      Rate and Rhythm: Normal rate and regular rhythm.   Pulmonary:      Effort: Pulmonary effort is normal.   Skin:            Comments: Rash under the breast, hyperpigmented    Neurological:      Mental Status: She is alert.   Psychiatric:         Mood and Affect: Mood normal.         Behavior: Behavior normal.         Thought Content: Thought content normal.         Judgment: Judgment normal.         Health Maintenance Due   Topic Date Due    Varicella vaccine (1 of 2 - 13+ 2-dose series) Never done    Hepatitis C screen  Never done    Hepatitis B vaccine (1 of 3 - 19+ 3-dose series) Never done    DTaP/Tdap/Td vaccine (2 - Td or Tdap) 06/12/2017    Cervical cancer screen  08/12/2020    Flu vaccine (1) Never done    COVID-19 Vaccine (4 - 2023-24 season) 09/01/2024